# Patient Record
Sex: FEMALE | Race: OTHER | Employment: UNEMPLOYED | URBAN - METROPOLITAN AREA
[De-identification: names, ages, dates, MRNs, and addresses within clinical notes are randomized per-mention and may not be internally consistent; named-entity substitution may affect disease eponyms.]

---

## 2021-01-11 ENCOUNTER — HOSPITAL ENCOUNTER (EMERGENCY)
Age: 36
Discharge: STILL ADMITTED OR ADMINISTRATIVE | End: 2021-01-11
Attending: EMERGENCY MEDICINE

## 2021-01-11 ENCOUNTER — APPOINTMENT (OUTPATIENT)
Dept: ULTRASOUND IMAGING | Age: 36
End: 2021-01-11
Attending: EMERGENCY MEDICINE

## 2021-01-11 VITALS
OXYGEN SATURATION: 100 % | DIASTOLIC BLOOD PRESSURE: 78 MMHG | HEART RATE: 89 BPM | TEMPERATURE: 98 F | RESPIRATION RATE: 17 BRPM | SYSTOLIC BLOOD PRESSURE: 114 MMHG

## 2021-01-11 DIAGNOSIS — O20.9 VAGINAL BLEEDING IN PREGNANCY, FIRST TRIMESTER: Primary | ICD-10-CM

## 2021-01-11 PROBLEM — Z3A.10 10 WEEKS GESTATION OF PREGNANCY: Status: ACTIVE | Noted: 2021-01-11

## 2021-01-11 PROBLEM — O20.0 THREATENED ABORTION, ANTEPARTUM: Status: ACTIVE | Noted: 2021-01-11

## 2021-01-11 LAB
ABO + RH BLD: NORMAL
ALBUMIN SERPL-MCNC: 3.8 G/DL (ref 3.5–5)
ALBUMIN/GLOB SERPL: 0.8 {RATIO} (ref 1.1–2.2)
ALP SERPL-CCNC: 67 U/L (ref 45–117)
ALT SERPL-CCNC: 37 U/L (ref 12–78)
ANION GAP SERPL CALC-SCNC: 3 MMOL/L (ref 5–15)
AST SERPL-CCNC: 22 U/L (ref 15–37)
BASOPHILS # BLD: 0 K/UL (ref 0–0.1)
BASOPHILS NFR BLD: 1 % (ref 0–1)
BILIRUB SERPL-MCNC: 0.4 MG/DL (ref 0.2–1)
BLOOD BANK CMNT PATIENT-IMP: NORMAL
BUN SERPL-MCNC: 7 MG/DL (ref 6–20)
BUN/CREAT SERPL: 12 (ref 12–20)
CALCIUM SERPL-MCNC: 9.5 MG/DL (ref 8.5–10.1)
CHLORIDE SERPL-SCNC: 105 MMOL/L (ref 97–108)
CO2 SERPL-SCNC: 29 MMOL/L (ref 21–32)
COMMENT, HOLDF: NORMAL
CREAT SERPL-MCNC: 0.58 MG/DL (ref 0.55–1.02)
DIFFERENTIAL METHOD BLD: ABNORMAL
EOSINOPHIL # BLD: 0.1 K/UL (ref 0–0.4)
EOSINOPHIL NFR BLD: 1 % (ref 0–7)
ERYTHROCYTE [DISTWIDTH] IN BLOOD BY AUTOMATED COUNT: 12.7 % (ref 11.5–14.5)
GLOBULIN SER CALC-MCNC: 4.7 G/DL (ref 2–4)
GLUCOSE SERPL-MCNC: 82 MG/DL (ref 65–100)
HCG SERPL-ACNC: ABNORMAL MIU/ML (ref 0–6)
HCT VFR BLD AUTO: 43.7 % (ref 35–47)
HCT, EXTERNAL: 47.3
HGB BLD-MCNC: 14.6 G/DL (ref 11.5–16)
HGB, EXTERNAL: 14.6
IMM GRANULOCYTES # BLD AUTO: 0.1 K/UL (ref 0–0.04)
IMM GRANULOCYTES NFR BLD AUTO: 1 % (ref 0–0.5)
LYMPHOCYTES # BLD: 2 K/UL (ref 0.8–3.5)
LYMPHOCYTES NFR BLD: 25 % (ref 12–49)
MCH RBC QN AUTO: 29 PG (ref 26–34)
MCHC RBC AUTO-ENTMCNC: 33.4 G/DL (ref 30–36.5)
MCV RBC AUTO: 86.9 FL (ref 80–99)
MONOCYTES # BLD: 0.5 K/UL (ref 0–1)
MONOCYTES NFR BLD: 6 % (ref 5–13)
NEUTS SEG # BLD: 5.4 K/UL (ref 1.8–8)
NEUTS SEG NFR BLD: 66 % (ref 32–75)
NRBC # BLD: 0 K/UL (ref 0–0.01)
NRBC BLD-RTO: 0 PER 100 WBC
PLATELET # BLD AUTO: 217 K/UL (ref 150–400)
PLATELET CNT,   EXTERNAL: 217
PMV BLD AUTO: 11.7 FL (ref 8.9–12.9)
POTASSIUM SERPL-SCNC: 3.5 MMOL/L (ref 3.5–5.1)
PROT SERPL-MCNC: 8.5 G/DL (ref 6.4–8.2)
RBC # BLD AUTO: 5.03 M/UL (ref 3.8–5.2)
SAMPLES BEING HELD,HOLD: NORMAL
SODIUM SERPL-SCNC: 137 MMOL/L (ref 136–145)
TYPE, ABO & RH, EXTERNAL: NORMAL
WBC # BLD AUTO: 8 K/UL (ref 3.6–11)

## 2021-01-11 PROCEDURE — 36415 COLL VENOUS BLD VENIPUNCTURE: CPT

## 2021-01-11 PROCEDURE — 85025 COMPLETE CBC W/AUTO DIFF WBC: CPT

## 2021-01-11 PROCEDURE — 76830 TRANSVAGINAL US NON-OB: CPT

## 2021-01-11 PROCEDURE — 84702 CHORIONIC GONADOTROPIN TEST: CPT

## 2021-01-11 PROCEDURE — 99282 EMERGENCY DEPT VISIT SF MDM: CPT

## 2021-01-11 PROCEDURE — 86900 BLOOD TYPING SEROLOGIC ABO: CPT

## 2021-01-11 PROCEDURE — 80053 COMPREHEN METABOLIC PANEL: CPT

## 2021-01-11 PROCEDURE — 76856 US EXAM PELVIC COMPLETE: CPT

## 2021-01-11 NOTE — DISCHARGE INSTRUCTIONS
Your ultrasound showed the baby had a good heart beat and is approximately 10 weeks 5 days. Please follow up with the [facility] doctors you met in the ER today. Thank you.

## 2021-01-11 NOTE — CONSULTS
Gynecology Consult    Name: Phil Homans MRN: [id number] SSN: [id number]    YOB: 1985  Age: 38 y.o. Sex: female       Subjective:      Chief complaint:  spotting    Naomy Hayes is a 38 y.o.  female with a history of previous miscarriage x2 who presents for 4 days of vaginal spotting. Pt states she is around 10 weeks pregnant, she first started having some spotting of blood 4 days ago. She presented to an OSH where an [address] showed a viable intrauterine pregnancy and on return to the same OSH 2 days later was told she had a \"dropping HCG. \" Since that time she has had continued spotting, prompting her to present to the ED. Pt's first miscarriage was at the age of 24 around 11 weeks, her second was when she was 33 at 12 weeks. Pt also notes that she has a hx of abnormal pap smear and was treated w/ a \"laser\" approximately 10yrs ago. She is not currently having any vaginal bleeding. She denies dizziness, lightheadedness, N/V, abd pain, dyspnea, fatigue, fever, chills. [facility] and does not have an OB that she sees regularly. The current method of family planning is none. History reviewed. No pertinent past medical history. History reviewed. No pertinent surgical history. OB History        1    Para        Term                AB        Living           SAB        TAB        Ectopic        Molar        Multiple        Live Births                  No Known Allergies      History reviewed. No pertinent family history.   Social History     Socioeconomic History    Marital status: SINGLE     Spouse name: Not on file    Number of children: Not on file    Years of education: Not on file    Highest education level: Not on file   Occupational History    Not on file   Social Needs    Financial resource strain: Not on file    Food insecurity     Worry: Not on file     Inability: Not on file   [employer] needs Medical: Not on file     Non-medical: Not on file   Tobacco Use    Smoking status: Never Smoker    Smokeless tobacco: Never Used   Substance and Sexual Activity    Alcohol use: Not Currently    Drug use: Never    Sexual activity: Not on file   Lifestyle    Physical activity     Days per week: Not on file     Minutes per session: Not on file    Stress: Not on file   Relationships    Social connections     Talks on phone: Not on file     Gets together: Not on file     Attends [employer] service: Not on file     Active member of club or organization: Not on file     Attends meetings of clubs or organizations: Not on file     Relationship status: Not on file    Intimate partner violence     Fear of current or ex partner: Not on file     Emotionally abused: Not on file     Physically abused: Not on file     Forced sexual activity: Not on file   Other Topics Concern    Not on file   Social History Narrative    Not on file       Review of Systems:  A comprehensive review of systems was negative except for that written in the History of Present Illness. Objective:     Vitals:    01/11/21 1153   BP: 114/78   Pulse: 89   Resp: 17   Temp: 98 °F (36.7 °C)   SpO2: 100%       General:  alert, cooperative, no distress, appears stated age   Skin:  Normal.   Eyes: conjunctivae/corneas clear. PERRL, EOM's intact. Fundi benign   Lungs:  clear to auscultation bilaterally   Heart:  regular rate and rhythm, S1, S2 normal, no murmur, click, rub or gallop   Abdomen: soft, non-tender. Bowel sounds normal. No masses,  no organomegaly   Extremities:  extremities normal, atraumatic, no cyanosis or edema   Neurologic:  AOx3.  CN II-XII grossly intact   Psychiatric:  anxious     Recent Results (from the past 24 hour(s))   CBC WITH AUTOMATED DIFF    Collection Time: 01/11/21 12:47 PM   Result Value Ref Range    WBC 8.0 3.6 - 11.0 K/uL    RBC 5.03 3.80 - 5.20 M/uL    HGB 14.6 11.5 - 16.0 g/dL    HCT 43.7 35.0 - 47.0 %    MCV 86.9 80.0 - 99.0 FL    MCH 29.0 26.0 - 34.0 PG    MCHC 33.4 30.0 - 36.5 g/dL    RDW 12.7 11.5 - 14.5 %    PLATELET 985 570 - 376 K/uL    MPV 11.7 8.9 - 12.9 FL    NRBC 0.0 0  WBC    ABSOLUTE NRBC 0.00 0.00 - 0.01 K/uL    NEUTROPHILS 66 32 - 75 %    LYMPHOCYTES 25 12 - 49 %    MONOCYTES 6 5 - 13 %    EOSINOPHILS 1 0 - 7 %    BASOPHILS 1 0 - 1 %    IMMATURE GRANULOCYTES 1 (H) 0.0 - 0.5 %    ABS. NEUTROPHILS 5.4 1.8 - 8.0 K/UL    ABS. LYMPHOCYTES 2.0 0.8 - 3.5 K/UL    ABS. MONOCYTES 0.5 0.0 - 1.0 K/UL    ABS. EOSINOPHILS 0.1 0.0 - 0.4 K/UL    ABS. BASOPHILS 0.0 0.0 - 0.1 K/UL    ABS. IMM. GRANS. 0.1 (H) 0.00 - 0.04 K/UL    DF AUTOMATED     METABOLIC PANEL, COMPREHENSIVE    Collection Time: 01/11/21 12:47 PM   Result Value Ref Range    Sodium 137 136 - 145 mmol/L    Potassium 3.5 3.5 - 5.1 mmol/L    Chloride 105 97 - 108 mmol/L    CO2 29 21 - 32 mmol/L    Anion gap 3 (L) 5 - 15 mmol/L    Glucose 82 65 - 100 mg/dL    BUN 7 6 - 20 MG/DL    Creatinine 0.58 0.55 - 1.02 MG/DL    BUN/Creatinine ratio 12 12 - 20      GFR est AA >60 >60 ml/min/1.73m2    GFR est non-AA >60 >60 ml/min/1.73m2    Calcium 9.5 8.5 - 10.1 MG/DL    Bilirubin, total 0.4 0.2 - 1.0 MG/DL    ALT (SGPT) 37 12 - 78 U/L    AST (SGOT) 22 15 - 37 U/L    Alk. phosphatase 67 45 - 117 U/L    Protein, total 8.5 (H) 6.4 - 8.2 g/dL    Albumin 3.8 3.5 - 5.0 g/dL    Globulin 4.7 (H) 2.0 - 4.0 g/dL    A-G Ratio 0.8 (L) 1.1 - 2.2     BLOOD TYPE, (ABO+RH)    Collection Time: 01/11/21 12:47 PM   Result Value Ref Range    ABO/Rh(D) O POSITIVE     Comment SAMPLE NOT USABLE FOR CROSSMATCH    SAMPLES BEING HELD    Collection Time: 01/11/21 12:47 PM   Result Value Ref Range    SAMPLES BEING HELD 1RED,1BL,1SST     COMMENT        Add-on orders for these samples will be processed based on acceptable specimen integrity and analyte stability, which may vary by analyte.    BETA HCG, QT    Collection Time: 01/11/21 12:48 PM   Result Value Ref Range    Beta HCG, QT 61,230 (H) 0 - 6 MIU/ML         Imaging:   Transvaginal US:  TRANSVAGINAL ULTRASOUND:  Realtime sonographic imaging of the pelvis was performed transvaginally. The  uterus  is normal in appearance. Ovaries are within normal limits. There is a left adnexal cyst measuring 5.5 x  4.2 x 5.9 cm. Nonspecific. An intrauterine gestation is identified. Gestational  sac, fetal pole and yolk sac are within normal limits. Fetal heart rate of 170. The cervix is within normal limits. The placenta is not visualized due to early  stage of gestation. Estimated gestational age of 9 weeks 6 days. Crown-rump  length of 3.08 cm consistent with a 10 week gestation. Gestational sac diameter  4.9 cm consistent with a 10 week 4 day gestation.     IMPRESSION:   Single intrauterine gestation. Intrauterine gestation is within normal limits.     Left adnexal cystic focus measures 5.5 x 4.2 x 5.9 cm. Nonspecific. Follow-up  ultrasound in 3-5 days to evaluate for diminished size/resolution recommended    Assessment:     36yo  at 9w 4d by transvaginal US who presents w/ vaginal spotting in the context of SIUP. Plan:     Vaginal spotting in the setting of SIUP: Pt w/ several days of spotting but denies heavy bleeding, abd pain, expulsion of POC. Transvaginal US shows viable IUP. HCG declining in comparison to labs from OSH however this is typical at ~10 weeks gestation. Pt asymptomatic and hemodynamically stable. POA Hgb 14.6. Spotting possibly related to normal pregnancy, less likely to threatened .   -Pt to f/u at [facility] in a few days for repeat US to assess continued viability of pregnancy and possible establishment of prenatal care. Patient seen, examined, and discussed with Dr. Lozano. I saw the patient and examined her with the resident. I agree with the diagnosis and plan.   Threatened , antepartum  Discharge home  Return for vaginal bleeding, and cramping  Follow up in 1 weeks     Signed By:  Leah Euceda MD January 11, 2021

## 2021-01-11 NOTE — PROGRESS NOTES
Pt notes she is 20 weeks pregnant. She noteson 1/7 she started with some vaginal spotting and some lower abdominal cramping, left and right-sided. She states she went to [facility] for evaluation who performed ultrasound showing that the baby was okay. She was instructed to return to them 2 days later, they noted that at that time her beta hCG had dropped. She is awaiting for an appointment with [facility] and is currently still waiting on the paperwork. Reported her vaginal bleeding stopped by this time however started again yesterday with some spotting. She notes worsening pain. She also notes nausea yesterday morning and this morning, she denies any currently. She does not have an OB/GYN established. 12:29 PM  I have evaluated the patient as the Provider in Triage. I have reviewed Her vital signs and the triage nurse assessment. I have talked with the patient and any available family and advised that I am the provider in triage and have ordered the appropriate study to initiate their work up based on the clinical presentation during my assessment. I have advised that the patient will be accommodated in the Main ED as soon as possible. I have also requested to contact the triage nurse or myself immediately if the patient experiences any changes in their condition during this brief waiting period.   Payton Mejia PA-C

## 2021-01-11 NOTE — ED NOTES
Discussed the importance of follow up care and seeking immediate medical attention for any changes or worsening in the patients condition. Patient verbalized understanding of discharge paperwork, medication use and follow up care.  Symptoms

## 2021-01-12 NOTE — ED PROVIDER NOTES
Patient is a 48-year-old G3, P0, with history of 2 miscarriages presents at approximately 9 weeks gestational age. Patient reports that she was seen at an outside hospital last week after she began having vaginal bleeding. Was told that her pregnancy hormone was not increasing appropriately and that she should follow-up with OB as an outpatient. Patient reports that she is waiting for an appointment at this time. Notes that her vaginal bleeding worsened overnight prompting ED visit today. Has her paperwork from outside hospital.           No past medical history on file. No past surgical history on file. No family history on file.     Social History     Socioeconomic History    Marital status: SINGLE     Spouse name: Not on file    Number of children: Not on file    Years of education: Not on file    Highest education level: Not on file   Occupational History    Not on file   Social Needs    Financial resource strain: Not on file    Food insecurity     Worry: Not on file     Inability: Not on file    Transportation needs     Medical: Not on file     Non-medical: Not on file   Tobacco Use    Smoking status: Not on file   Substance and Sexual Activity    Alcohol use: Not on file    Drug use: Not on file    Sexual activity: Not on file   Lifestyle    Physical activity     Days per week: Not on file     Minutes per session: Not on file    Stress: Not on file   Relationships    Social connections     Talks on phone: Not on file     Gets together: Not on file     Attends Muslim service: Not on file     Active member of club or organization: Not on file     Attends meetings of clubs or organizations: Not on file     Relationship status: Not on file    Intimate partner violence     Fear of current or ex partner: Not on file     Emotionally abused: Not on file     Physically abused: Not on file     Forced sexual activity: Not on file   Other Topics Concern    Not on file   Social History Narrative    Not on file         ALLERGIES: Patient has no known allergies. Review of Systems   Constitutional: Negative for chills and fever. HENT: Negative for drooling and nosebleeds. Eyes: Negative for pain and itching. Respiratory: Negative for choking and stridor. Cardiovascular: Negative for leg swelling. Gastrointestinal: Negative for abdominal distention, abdominal pain and rectal pain. Endocrine: Negative for heat intolerance and polyphagia. Genitourinary: Positive for vaginal bleeding. Negative for difficulty urinating, dyspareunia, dysuria, enuresis, flank pain, frequency and genital sores. Musculoskeletal: Negative for arthralgias and joint swelling. Skin: Negative for color change. Allergic/Immunologic: Negative for immunocompromised state. Neurological: Negative for tremors and speech difficulty. Hematological: Negative for adenopathy. Psychiatric/Behavioral: Negative for dysphoric mood and sleep disturbance. Vitals:    01/11/21 1153   BP: 114/78   Pulse: 89   Resp: 17   Temp: 98 °F (36.7 °C)   SpO2: 100%            Physical Exam  Vitals signs and nursing note reviewed. Constitutional:       General: She is not in acute distress. Appearance: She is well-developed. She is not ill-appearing, toxic-appearing or diaphoretic. HENT:      Head: Normocephalic. Nose: Nose normal.   Eyes:      Conjunctiva/sclera: Conjunctivae normal.   Neck:      Musculoskeletal: Normal range of motion and neck supple. Cardiovascular:      Rate and Rhythm: Regular rhythm. Heart sounds: Normal heart sounds. Pulmonary:      Effort: Pulmonary effort is normal. No respiratory distress. Breath sounds: Normal breath sounds. Abdominal:      General: There is no distension. Palpations: Abdomen is soft. Tenderness: There is no abdominal tenderness. Musculoskeletal: Normal range of motion. General: No deformity.    Skin:     General: Skin is warm and dry.   Neurological:      Mental Status: She is alert. Coordination: Coordination normal.   Psychiatric:         Behavior: Behavior normal.          [facility]  ED Course as of Jan 11 1932   Mon Jan 11, 2021   1248 Per records from [facility] that pt has, beta [place] [id number]. US 1/7/20: viable single IUP CRL 26.3 mm, 9 weeks, 3 days. ?hemorrhage. [AL]   1249 Hb per OSH discharge papers 12.9.    [AL]   [address] [AL]   56 Spoke with FP OB who will come down to see patient and establish care with her. And will follow up with her.     [AL]      ED Course User Index  [AL] Lucero Torres MD       Procedures    Patient's results have been reviewed with them. Patient and/or family have verbally conveyed their understanding and agreement of the patient's signs, symptoms, diagnosis, treatment and prognosis and additionally agree to follow up as recommended or return to the Emergency Room should their condition change prior to follow-up. Discharge instructions have also been provided to the patient with some educational information regarding their diagnosis as well a list of reasons why they would want to return to the ER prior to their follow-up appointment should their condition change.

## 2021-01-14 ENCOUNTER — TELEPHONE (OUTPATIENT)
Dept: FAMILY MEDICINE CLINIC | Age: 36
End: 2021-01-14

## 2021-01-15 ENCOUNTER — TELEPHONE (OUTPATIENT)
Dept: FAMILY MEDICINE CLINIC | Age: 36
End: 2021-01-15

## 2021-01-15 NOTE — TELEPHONE ENCOUNTER
----- Message from Tilman Severance sent at 1/14/2021  3:56 PM EST -----  Regarding: /Telephone  Contact: [phone]  General Message/Vendor Calls    Caller's first and last name: N/A      Reason for call: ultrasound appt / COVID protocol      Callback required yes/no and why: Yes, to verify. Best contact number(s): [phone]      Details to clarify the request: Pt would like to know if her boyfriend can attend the appt with her for her ultrasound so he can see the baby too?        Tilman Severance

## 2021-01-20 ENCOUNTER — HOSPITAL ENCOUNTER (OUTPATIENT)
Dept: LAB | Age: 36
Discharge: HOME | End: 2021-01-20

## 2021-01-20 ENCOUNTER — INITIAL PRENATAL (OUTPATIENT)
Dept: FAMILY MEDICINE CLINIC | Age: 36
End: 2021-01-20

## 2021-01-20 VITALS
HEART RATE: 84 BPM | HEIGHT: 62 IN | BODY MASS INDEX: 27.88 KG/M2 | OXYGEN SATURATION: 97 % | RESPIRATION RATE: 20 BRPM | SYSTOLIC BLOOD PRESSURE: 118 MMHG | WEIGHT: 151.5 LBS | DIASTOLIC BLOOD PRESSURE: 71 MMHG | TEMPERATURE: 97.6 F

## 2021-01-20 DIAGNOSIS — Z3A.10 10 WEEKS GESTATION OF PREGNANCY: Primary | ICD-10-CM

## 2021-01-20 DIAGNOSIS — B97.7 HPV (HUMAN PAPILLOMA VIRUS) INFECTION: ICD-10-CM

## 2021-01-20 DIAGNOSIS — Z98.890 HISTORY OF LOOP ELECTRICAL EXCISION PROCEDURE (LEEP): ICD-10-CM

## 2021-01-20 DIAGNOSIS — O20.0 THREATENED ABORTION, ANTEPARTUM: ICD-10-CM

## 2021-01-20 DIAGNOSIS — O09.519 ADVANCED MATERNAL AGE, PRIMIGRAVIDA, ANTEPARTUM: ICD-10-CM

## 2021-01-20 LAB
CHLAMYDIA, EXTERNAL: NEGATIVE
N. GONORRHEA, EXTERNAL: NEGATIVE

## 2021-01-20 PROCEDURE — 90686 IIV4 VACC NO PRSV 0.5 ML IM: CPT | Performed by: STUDENT IN AN ORGANIZED HEALTH CARE EDUCATION/TRAINING PROGRAM

## 2021-01-20 PROCEDURE — 88175 CYTOPATH C/V AUTO FLUID REDO: CPT

## 2021-01-20 PROCEDURE — 90471 IMMUNIZATION ADMIN: CPT | Performed by: STUDENT IN AN ORGANIZED HEALTH CARE EDUCATION/TRAINING PROGRAM

## 2021-01-20 PROCEDURE — 87624 HPV HI-RISK TYP POOLED RSLT: CPT

## 2021-01-20 PROCEDURE — 0500F INITIAL PRENATAL CARE VISIT: CPT | Performed by: STUDENT IN AN ORGANIZED HEALTH CARE EDUCATION/TRAINING PROGRAM

## 2021-01-20 NOTE — PROGRESS NOTES
Subjective:   Leela Christianson is a 38 y.o.  who is being seen today for her first obstetrical visit. Patient reports feeling well. No concerns at this time. Pregnancy complicated by H/o LEEP (), and thrreatened  1st Trimester. OB History:  See Chart    This is a planned pregnancy. LMP: 10/30/2020  GA: 11w5d by LMP  Estimated Date of Delivery: 2021     Taking prenatal vitamins? Yes   History of Sexual trauma? NO  History of STI's? HPV  History of Depression? NO    Relevant past medical history:(relevant to this pregnancy):   Denies HTN, DM or asthma. Denies thyroid problems      Pap smear history:  Last pap smear: Does not remember     Substance history:   She does not report current tobacco use. She does not report current alcohol use. She does not report current drug use. Exposure history: There are not indoor cat(s) in the home. Patient does not report issues with domestic violence. Allergies- reviewed:   No Known Allergies    Medications- reviewed:   No current outpatient medications on file. No current facility-administered medications for this visit. Past Medical History- reviewed:  Past Medical History:   Diagnosis Date    Abnormal Papanicolaou smear of cervix 2010    Normal Pap since    History of loop electrical excision procedure (LEEP) 2021    HPV (human papilloma virus) infection 2021       Past Surgical History- reviewed:   History reviewed. No pertinent surgical history.     Social History- reviewed:  Social History     Socioeconomic History    Marital status: SINGLE     Spouse name: Not on file    Number of children: Not on file    Years of education: Not on file    Highest education level: Not on file   Occupational History    Not on file   Social Needs    Financial resource strain: Not on file    Food insecurity     Worry: Not on file     Inability: Not on file   [employer] needs Medical: Not on file     Non-medical: Not on file   Tobacco Use    Smoking status: Never Smoker    Smokeless tobacco: Never Used   Substance and Sexual Activity    Alcohol use: Not Currently    Drug use: Never    Sexual activity: Not on file   Lifestyle    Physical activity     Days per week: Not on file     Minutes per session: Not on file    Stress: Not on file   Relationships    Social connections     Talks on phone: Not on file     Gets together: Not on file     Attends Jainism service: Not on file     Active member of club or organization: Not on file     Attends meetings of clubs or organizations: Not on file     Relationship status: Not on file    Intimate partner violence     Fear of current or ex partner: Not on file     Emotionally abused: Not on file     Physically abused: Not on file     Forced sexual activity: Not on file   Other Topics Concern    Not on file   Social History Narrative    Not on file       OB History- reviewed:  OB History    Para Term  AB Living   3       2     SAB TAB Ectopic Molar Multiple Live Births                    # Outcome Date GA Lbr Jeremiah/2nd Weight Sex Delivery Anes PTL Lv   3 Current            2 AB 18 8w0d    SAB      1 AB 12 8w0d    SAB           Objective:     Visit Vitals  /71 (BP 1 Location: Left arm, BP Patient Position: Sitting)   Pulse 84   Temp 97.6 °F (36.4 °C) (Temporal)   Resp 20   Ht 5' 2\" (1.575 m)   Wt 151 lb 8 oz (68.7 kg)   LMP 10/30/2020 (Exact Date)   SpO2 97%   BMI 27.71 kg/m²       See physical exam on flowsheet   [employer] exam chaperoned by nursing    Labs:  No results found for this or any previous visit (from the past 12 hour(s)).       Assessment and Plan:         ICD-10-CM ICD-9-CM    1. 10 weeks gestation of pregnancy  Z3A.10 V22.2 CULTURE, URINE      RPR      HIV 1/2 AG/AB, 4TH GENERATION,W RFLX CONFIRM      PAP IG, APTIMA HPV AND RFX 16/18,45 ([id number])      CHLAMYDIA/GC PCR      HEP B SURFACE AG HEMOGLOBIN FRACTIONATION      RUBELLA AB, IGG      VZV AB, IGG      INFLUENZA VIRUS VAC QUAD,SPLIT,PRESV FREE SYRINGE IM      CT IMMUNIZ ADMIN,1 SINGLE/COMB VAC/TOXOID      PAP IG, APTIMA HPV AND RFX 16/18,45 ([id number])      CULTURE, URINE      CHLAMYDIA/GC PCR   2. History of loop electrical excision procedure (LEEP)  Z98.890 V45.89    3. Threatened , antepartum  O20.0 640.03    4. HPV (human papilloma virus) infection  B97.7 079.4    5. Advanced maternal age, primigravida, antepartum  O12.26 18.46        38 y.o.  11w5d, Estimated Date of Delivery: 21 here for initial OB visit     Prenatal care  · IOB labs collected  · () CBC (Hgb 14.6) wnl, CMP wnl, O+  · Discussed recommended weight gain: 15-25lb. Starting Weight 151. · Cont Prenatal vitamins  · Influenza Vaccine: Today  · Discussed optional genetic screening: Info given, will discuss with family and given decision on next visit  · Request for [facility] anatomy scan faxed: Today  · Follow up in 4 weeks    H/o LEEP w/ h/o SAB: LEEP approximately in . Reports normal Paps since . - Will refer to [facility] for cervical length screening    Threatened : Seen in ED for vaginal spot bleeding on 2021. Vaginal bleeding has resolved. US at that time showed SIUP consistent w/ 10w4d gestation. AMA: BMI < 30. No Significant PMH. [id number].  - No indication for ASA      ---------------------------------------  · Continuity Provider: Dr. Rusty Rosario  · Pain mgmt.  in labor:   · Feeding:   · Circ:    ---------------------------------------    Orders Placed This Encounter    CT IMMUNIZ ADMIN,1 SINGLE/COMB VAC/TOXOID    CULTURE, URINE     Standing Status:   Future     Number of Occurrences:   1     Standing Expiration Date:   2021    CHLAMYDIA/GC PCR     Standing Status:   Future     Number of Occurrences:   1     Standing Expiration Date:   2021     Order Specific Question:   Sample source     Answer:   Urine [258]     Order Specific Question:   Specimen source     Answer:   Urine [258]    INFLUENZA VIRUS VAC QUAD,SPLIT,PRESV FREE SYRINGE IM (Flulaval, Fluzone, Fluarix) ([id number])    RPR     Standing Status:   Future     Standing Expiration Date:   1/20/2022    HIV 1/2 AG/AB, 4TH GENERATION,W RFLX CONFIRM     Standing Status:   Future     Standing Expiration Date:   1/20/2022    HEP B SURFACE AG     Standing Status:   Future     Standing Expiration Date:   1/20/2022    HEMOGLOBIN FRACTIONATION     Standing Status:   Future     Standing Expiration Date:   1/20/2022    RUBELLA AB, IGG     Standing Status:   Future     Standing Expiration Date:   7/20/2021    VZV AB, IGG     Standing Status:   Future     Standing Expiration Date:   1/20/2022    PAP IG, APTIMA HPV AND RFX 16/18,45 ([id number])     Standing Status:   Future     Number of Occurrences:   1     Standing Expiration Date:   1/20/2022     Order Specific Question:   Pap Source? Answer:   Endocervical     Order Specific Question:   Total Hysterectomy? Answer:   No     Order Specific Question:   Supracervical Hysterectomy? Answer:   No     Order Specific Question:   Post Menopausal?     Answer:   No     Order Specific Question:   Hormone Therapy? Answer:   No     Order Specific Question:   IUD? Answer:   No     Order Specific Question:   Abnormal Bleeding? Answer:   No     Order Specific Question:   Pregnant     Answer:   Yes     Order Specific Question:   Post Partum? Answer:   No     Order Specific Question:   Previous Treatment? Answer:   Peace Early         I have discussed the diagnosis with the patient and the intended plan as seen in the above orders. The patient has received an after-visit summary and questions were answered concerning future plans. I have discussed medication side effects and warnings with the patient as well.  Informed pt to return to the office or go to the ER if she experiences vaginal bleeding, vaginal discharge, leaking of fluid, pelvic cramping.       Pt seen and discussed with Isa (attending physician)    Kristyn Springer MD  Family Medicine Resident

## 2021-01-20 NOTE — PATIENT INSTRUCTIONS
Weeks 10 to 14 of Your Pregnancy: Care Instructions Your Care Instructions By weeks 10 to 15 of your pregnancy, the placenta has formed inside your uterus. It is possible to hear your baby's heartbeat with a special ultrasound device. Your baby's eyes can and do move. The arms and legs can bend. This is a good time to think about testing for birth defects. There are two types of tests: screening and diagnostic. Screening tests show the chance that a baby has a certain birth defect. They can't tell you for sure that your baby has a problem. Diagnostic tests show if a baby has a certain birth defect. It's your choice whether to have these tests. You and your partner can talk to your doctor or midwife about birth defects tests. Follow-up care is a key part of your treatment and safety. Be sure to make and go to all appointments, and call your doctor if you are having problems. It's also a good idea to know your test results and keep a list of the medicines you take. How can you care for yourself at home? Decide about tests · You can have screening tests and diagnostic tests to check for birth defects. The decision to have a test for birth defects is personal. Think about your age, your chance of passing on a family disease, your need to know about any problems, and what you might do after you have the test results. ? Triple or quadruple (quad) blood tests. These screening tests can be done between 15 and 20 weeks of pregnancy. They check the amounts of three or four substances in your blood. The doctor looks at these test results, along with your age and other factors, to find out the chance that your baby may have certain problems. ? Amniocentesis. This diagnostic test is used to look for chromosomal problems in the baby's cells. It can be done between 15 and 20 weeks of pregnancy, usually around week 16. ? Nuchal translucency test. This test uses ultrasound to measure the thickness of the area at the back of the baby's neck. An increase in the thickness can be an early sign of Down syndrome. ? Chorionic villus sampling (CVS). This is a test that looks for certain genetic problems with your baby. The same genes that are in your baby are in the placenta. A small piece of the placenta is taken out and tested. This test is done when you are 10 to 13 weeks pregnant. Ease discomfort · Slow down and take naps when you feel tired. · If your emotions swing, talk to someone. Crying, anxiety, and concentration problems are common. · If your gums bleed, try a softer toothbrush. If your gums are puffy and bleed a lot, see your dentist. 
· If you feel dizzy: ? Get up slowly after sitting or lying down. ? Drink plenty of fluids. ? Eat small snacks to keep your blood sugar stable. ? Put your head between your legs as though you were tying your shoelaces. ? Lie down with your legs higher than your head. Use pillows to prop up your feet. · If you have a headache: ? Lie down. ? Ask your partner or a good friend for a neck massage. ? Try cool cloths over your forehead or across the back of your neck. ? Use acetaminophen (Tylenol) for pain relief. Do not use nonsteroidal anti-inflammatory drugs (NSAIDs), such as ibuprofen (Advil, Motrin) or naproxen (Aleve), unless your doctor says it is okay. · If you have a nosebleed, pinch your nose gently, and hold it for a short while. To prevent nosebleeds, try massaging a small dab of petroleum jelly, such as Vaseline, in your nostrils. · If your nose is stuffed up, try saline (saltwater) nose sprays. Do not use decongestant sprays. Care for your breasts · Wear a bra that gives you good support. · Know that changes in your breasts are normal. 
? Your breasts may get larger and more tender. Tenderness usually gets better by 12 weeks. ? Your nipples may get darker and larger, and small bumps around your nipples may show more. ? The veins in your chest and breasts may show more. · Don't worry about \"toughening'\" your nipples. Breastfeeding will naturally do this. Where can you learn more? Go to http://www.gray.com/ Enter L486 in the search box to learn more about \"Weeks 10 to 14 of Your Pregnancy: Care Instructions. \" Current as of: February 11, 2020               Content Version: 12.6 © 4121-5925 [employer]. Care instructions adapted under license by [employer] (which disclaims liability or warranty for this information). If you have questions about a medical condition or this instruction, always ask your healthcare professional. [address] any warranty or liability for your use of this information.

## 2021-01-20 NOTE — PROGRESS NOTES
I reviewed with the resident the medical history and the resident's findings on the physical examination. I discussed with the resident the patient's diagnosis and concur with the plan. 34yo  @ 11w5d by LMP c/w 10wk scan   1. IUP: Rh pos, undecided about genetic screening but handout given   2. Hx LEEP: , normal paps since that time, desires CLS with MFM given risk of cervical insufficiency   3.   AMA    Referred to [facility]

## 2021-01-21 LAB
BACTERIA SPEC CULT: NORMAL
SERVICE CMNT-IMP: NORMAL

## 2021-01-22 LAB
C TRACH DNA SPEC QL NAA+PROBE: NEGATIVE
N GONORRHOEA DNA SPEC QL NAA+PROBE: NEGATIVE
SAMPLE TYPE: NORMAL
SERVICE CMNT-IMP: NORMAL
SPECIMEN SOURCE: NORMAL

## 2021-01-28 ENCOUNTER — HOSPITAL ENCOUNTER (OUTPATIENT)
Dept: PERINATAL CARE | Age: 36
Discharge: HOME | End: 2021-01-28
Attending: OBSTETRICS & GYNECOLOGY

## 2021-01-28 PROCEDURE — 76801 OB US < 14 WKS SINGLE FETUS: CPT | Performed by: OBSTETRICS & GYNECOLOGY

## 2021-02-04 NOTE — PROGRESS NOTES
Estimated Date of Delivery: 21    Hx of LEEP  Cervix shortened but too early to measure  F/u scan in 4 weeks  Placenta previa  Normal NT  5cm left ovarian cyst

## 2021-02-17 ENCOUNTER — ROUTINE PRENATAL (OUTPATIENT)
Dept: FAMILY MEDICINE CLINIC | Age: 36
End: 2021-02-17

## 2021-02-17 DIAGNOSIS — Z98.890 HISTORY OF LOOP ELECTRICAL EXCISION PROCEDURE (LEEP): ICD-10-CM

## 2021-02-17 DIAGNOSIS — N83.202 LEFT OVARIAN CYST: ICD-10-CM

## 2021-02-17 DIAGNOSIS — Z3A.15 15 WEEKS GESTATION OF PREGNANCY: Primary | ICD-10-CM

## 2021-02-17 DIAGNOSIS — O09.522 MULTIGRAVIDA OF ADVANCED MATERNAL AGE IN SECOND TRIMESTER: ICD-10-CM

## 2021-02-17 DIAGNOSIS — O44.02 PLACENTA PREVIA IN SECOND TRIMESTER: ICD-10-CM

## 2021-02-17 PROCEDURE — 0502F SUBSEQUENT PRENATAL CARE: CPT | Performed by: STUDENT IN AN ORGANIZED HEALTH CARE EDUCATION/TRAINING PROGRAM

## 2021-02-17 RX ORDER — SWAB
1 SWAB, NON-MEDICATED MISCELLANEOUS DAILY
COMMUNITY

## 2021-02-17 NOTE — PROGRESS NOTES
Fara Lal  38 y.o. female  1985  [address]  [id number]    [phone] (home)      [address]:    [facility]  [facility]       Encounter Date: 2021 at 10:00 AM    Consent:  She and/or the health care decision maker is aware that this encounter will be billed as a routine OB appointment and that she may receive a bill for this telephone service, depending on her insurance coverage, and has provided verbal consent to proceed: Yes    Follow-up Prenatal     History of Present Illness   Due to language barrier, an  was used with this patient - [facility]  Miriam Veronica. Contractions: no  LOF: no  Vaginal bleeding: no  Fetal movement (if >20 wk): no, too early    COVID-19 Screenin) Patient denies complaints of shortness of breath or difficulty breathing, sore throat,  chills, fatigue, muscle aches, loss of taste or smell, or GI symptoms(nausea, vomiting or diarrhea): No  2) Patient denies complaints of cough or fever over 100F: No  3) Patient denies leaving the country in the past 14 days or any other recent travel: No  4) Patient denies being exposed to anyone with COVID-19 and has not been around any one that has been sick with COVID-19 like symptoms as listed above: No  5) Patient informed to wear mask when arriving for appointment: Yes    Vitals/Objective:   General: Patient speaking in complete sentences without effort. Normal speech and cooperative. Due to this being a Virtual Check-in/Telephone evaluation, many elements of the physical examination are unable to be assessed. Assessment and Plan:   Fara Lal is a 38 y.o.  @ 15w5d by LMP = 10 wk US evaluated by telephone. 1. IUP: O pos, IOB labs never collected?, GC/CT neg, pap NILM, HPV neg. Declined genetic testing.  S/p flu.   2. Hx LEEP:  at [facility], normal paps since that time, desires cervical length screening with MFM given risk of cervical insufficiency - early scan appears short but too early to measure; follow up scan scheduled for 2/25  3. AMA  4. Placenta previa: had VB in 1st tri. Seen on early MFM scan. Repeat scan 2/25  5. L ovarian cyst: 5cm, follow up pp    Continuity Residents: None    -Patient informed of her next appointment: 3/17/2021 at 8:45AM in clinic  -Advised to come in sooner for any concerns  -Pt informed that after 28 wk she will be asked to do weekly home BP monitoring and it was recommended she buy or borrow a cuff ahead of time. Alternative is going to a grocery store/pharmacy to check. One BP should be checked weekly and written in a log >28 weeks.  -Patient educated on kick counts (after 28 weeks): baby should move 10 times in 2 hours (can be a kick, roll, flutter, swish). -Patient was reminded about social distancing and to avoid going into public when possible. Patient understands that this encounter was a temporary measure, and the importance of further follow up and examination was emphasized. Patient verbalized understanding. I affirm this is a Patient Initiated Episode with an Established Patient who has not had a related appointment within my department in the past 7 days or scheduled within the next 24 hours. Note: not billable if this call serves to triage the patient into an appointment for the relevant concern      Electronically Signed: Axel Oliva DO  Providers location when delivering service: home      ICD-10-CM ICD-9-CM    1. 15 weeks gestation of pregnancy  Z3A.15 V22.2    2. History of loop electrical excision procedure (LEEP)  Z98.890 V45.89    3. Multigravida of advanced maternal age in second trimester  O09.522 65.56    4. Placenta previa in second trimester  O44.02 641.13    5.  Left ovarian cyst  N83.202 620.2        Pursuant to the emergency declaration under the [facility]5 waiver authority and the Coronavirus Preparedness and Response Supplemental Appropriations Act, this Virtual  Visit was conducted, with patient's consent, to reduce the patient's risk of exposure to COVID-19 and provide continuity of care for an established patient. History   Patients past medical, surgical and family histories were personally reviewed and updated.   yes    Patient Active Problem List   Diagnosis Code    Threatened , antepartum O20.0    10 weeks gestation of pregnancy Z3A.10    History of loop electrical excision procedure (LEEP) Z98.890    HPV (human papilloma virus) infection B97.7    Multigravida of advanced maternal age in second trimester O09.522    Placenta previa in second trimester O44.02    Left ovarian cyst N83.202              Current Medications/Allergies   Medications and Allergies reviewed:      No Known Allergies

## 2021-02-17 NOTE — PROGRESS NOTES
I reviewed with the resident the medical history and the resident's findings on the physical examination. I discussed with the resident the patient's diagnosis and concur with the plan. 36yo  @ 15w5d by LMP c/w 10wk scan   1. IUP: Rh pos, undecided about genetic screening but handout given, her IOB labs not collected at last visit so needs at next    2. Hx LEEP: , normal paps since that time, desires CLS with M given risk of cervical insufficiency (got scheduled by MFM office a bit too early so had one scan already with no concerns but too early to measure)   3.   AMA    Referred to [facility]

## 2021-02-25 ENCOUNTER — HOSPITAL ENCOUNTER (OUTPATIENT)
Dept: PERINATAL CARE | Age: 36
Discharge: HOME | End: 2021-02-25
Attending: OBSTETRICS & GYNECOLOGY

## 2021-02-25 PROCEDURE — 76817 TRANSVAGINAL US OBSTETRIC: CPT | Performed by: OBSTETRICS & GYNECOLOGY

## 2021-02-25 PROCEDURE — 76815 OB US LIMITED FETUS(S): CPT | Performed by: OBSTETRICS & GYNECOLOGY

## 2021-02-25 NOTE — PROGRESS NOTES
Estimated Date of Delivery: 21    Hx of LEEP   Cervix 27mm no funnel  F/u cervical length at anatomy scan  [facility]

## 2021-03-16 NOTE — PROGRESS NOTES
Return OB Visit     Subjective:   Chantelle Rubio is a 38 y.o.  at 19w4d by LMP c/w 10 wk scan  Estimated Date of Delivery: 21    LOF: None  Vaginal bleeding: None  Fetal movement (after 20 weeks): Too early   Contractions: None  Dysuria: None  Headaches, blurred vision, RUQ pain: None  Taking prenatal vitamins: Yes    Concerns today: Rash on breast that started 2 weeks ago, it was initially distributed all over right breast, but currently on a small aspect of her skin, itches, not painful and cold water helps. Sharp pain on right hip, comes and go, laying down makes it better. Allergies   No Known Allergies  Medications:   Current Outpatient Medications   Medication Sig    prenatal vit-iron fumarate-fa (PRENATAL PLUS with IRON) 28 mg iron- 800 mcg tab Take 1 Tab by mouth daily. No current facility-administered medications for this visit. Past Medical History:  Past Medical History:   Diagnosis Date    Abnormal Papanicolaou smear of cervix 2010    Normal Pap since    History of loop electrical excision procedure (LEEP) 2021    HPV (human papilloma virus) infection 2021     Past Surgical History:   No past surgical history on file. Social History:  Social History     Tobacco Use    Smoking status: Never Smoker    Smokeless tobacco: Never Used   Substance Use Topics    Alcohol use: Not Currently    Drug use: Never     Immunizations:   Immunization History   Administered Date(s) Administered    Influenza Vaccine Savosolar) PF (>6 Mo Flulaval, Fluarix, and >3 Yrs Afluria, Fluzone 29777) 2021     Objective     Visit Vitals  /73   Pulse 78   Temp 97.3 °F (36.3 °C) (Temporal)   Resp 16   Ht 5' 2\" (1.575 m)   Wt 149 lb (67.6 kg)   LMP 10/30/2020 (Exact Date)   SpO2 98%   BMI 27.25 kg/m²       Physical Exam  GENERAL APPEARANCE: alert, well appearing, in no apparent distress  ABDOMEN: gravid, fundal height 20 cm.   PSYCH: normal mood and affect  Breast: about 1 cm erythematous spot in lateral aspect of right breast. No discharge, not painful.     Assessment   Veronica Sterling is a 35 y.o.  at 19w5d by LMP here for a return OB visit.   Estimated Date of Delivery: 21     Plan     IUP: O pos. GC/CT neg, pap NILM, HPV neg. Declined genetic testing. S/p flu.  - Labs were collected today at the clinic.     Hx LEEP:  at [facility], normal paps since that time. scan on (): cervix is 27 mm long, with no evidence of a funnel.   - Anatomy survey /cervix length in 4 weeks () at [facility]:   - NIPT collected.      Placenta previa: had VB in  tri. Seen on early MFM scan. Repeat scan () showed anterior placenta.     L ovarian cyst: 5cm in size  - Follow up pp    Hip Pain: likely 2/2 round ligament pain.   - pt given information about round ligament pain.   - use tylenol as needed    R breast rash: improving. likely 2/2 skin irritation   - Use otc calamine lotion as needed for itching.       I have discussed the diagnosis with the patient and the intended plan as seen in the above orders.  The patient has received an after-visit summary and questions were answered concerning future plans.  I have discussed medication side effects and warnings with the patient as well. Informed pt to return to the office or go to the ER if she experiences vaginal bleeding, vaginal discharge, leaking of fluid, pelvic cramping.    Patient discussed with Dr. Moss Attending Physician    Veronica Herrera MD  Family Medicine Resident

## 2021-03-17 ENCOUNTER — ROUTINE PRENATAL (OUTPATIENT)
Dept: FAMILY MEDICINE CLINIC | Age: 36
End: 2021-03-17

## 2021-03-17 VITALS
TEMPERATURE: 97.3 F | DIASTOLIC BLOOD PRESSURE: 73 MMHG | RESPIRATION RATE: 16 BRPM | SYSTOLIC BLOOD PRESSURE: 108 MMHG | BODY MASS INDEX: 27.42 KG/M2 | HEIGHT: 62 IN | OXYGEN SATURATION: 98 % | WEIGHT: 149 LBS | HEART RATE: 78 BPM

## 2021-03-17 DIAGNOSIS — Z3A.10 10 WEEKS GESTATION OF PREGNANCY: ICD-10-CM

## 2021-03-17 DIAGNOSIS — Z3A.19 19 WEEKS GESTATION OF PREGNANCY: Primary | ICD-10-CM

## 2021-03-17 LAB
HBSAG, EXTERNAL: NEGATIVE
HIV, EXTERNAL: NEGATIVE
RPR, EXTERNAL: NON REACTIVE
RUBELLA, EXTERNAL: NORMAL

## 2021-03-17 PROCEDURE — 0502F SUBSEQUENT PRENATAL CARE: CPT | Performed by: STUDENT IN AN ORGANIZED HEALTH CARE EDUCATION/TRAINING PROGRAM

## 2021-03-17 NOTE — PROGRESS NOTES
I reviewed with the resident the medical history and the resident's findings on the physical examination. I discussed with the resident the patient's diagnosis and concur with the plan. 34yo  @ 19w5d by LMP c/w 10wk scan   1. IUP: Rh pos, NIPT collected, her IOB labs not collected at last visit so collected today  2. Hx LEEP: , normal paps since that time, CLS have been normal thus far   3.   AMA    Referred to [facility]

## 2021-03-17 NOTE — PROGRESS NOTES
Chief Complaint   Patient presents with    Routine Prenatal Visit     19w 5 d pt denies bleeding, contractions or leakage of fluids.  Rash     on breast      Vitals:    03/17/21 0859   BP: 108/73   Pulse: 78   Resp: 16   Temp: 97.3 °F (36.3 °C)   TempSrc: Temporal   SpO2: 98%   Weight: 149 lb (67.6 kg)   Height: 5' 2\" (1.575 m)   1. Have you been to the ER, urgent care clinic since your last visit? Hospitalized since your last visit? No    2. Have you seen or consulted any other health care providers outside of the [address] [place] [address] since your last visit? Include any pap smears or colon screening.  No

## 2021-03-17 NOTE — PATIENT INSTRUCTIONS
Round Ligament Pain: Care Instructions Your Care Instructions Round ligament pain is a common pain during pregnancy. You may feel a sharp brief pain on one or both sides of your belly. It may go down into your groin. It's usually felt for the first time during the second trimester. This pain is a normal part of pregnancy. It will go away as your pregnancy continues or after your baby is born. Your uterus is supported by two ligaments that go from the top and sides of the uterus to the bones of the pelvis. These are the round ligaments. As your uterus grows, these ligaments stretch and tighten with your movements. This may be the cause of the pain. You may find that certain activities seem to cause pain. If you can, avoid those activities. Your doctor can usually diagnose round ligament pain from your symptoms and an exam. If you have bleeding or other symptoms, your doctor may also do an imaging test, such as an ultrasound. Your doctor may suggest that you take an over-the-counter pain medicine, such as acetaminophen. Follow-up care is a key part of your treatment and safety. Be sure to make and go to all appointments, and call your doctor if you are having problems. It's also a good idea to know your test results and keep a list of the medicines you take. How can you care for yourself at home? · If certain movements seem to trigger the pain, see if you can avoid them while you are pregnant. · Stay active. If your doctor says it's okay, try moderate exercise. Many pregnant women find that water exercise is most comfortable. Examples are swimming and water aerobics. · Ask your doctor about taking acetaminophen for pain. Be safe with medicines. Read and follow all instructions on the label. When should you call for help? Call your doctor now or seek immediate medical care if: 
  · You think you might be in labor.  
  · You have new or worse pain.   
Watch closely for changes in your health, and be sure to contact your doctor if you have any problems. Where can you learn more? Go to http://www.gray.com/ Enter R110 in the search box to learn more about \"Round Ligament Pain: Care Instructions. \" Current as of: February 11, 2020               Content Version: 12.6 © 8594-9471 [employer]. Care instructions adapted under license by [employer] (which disclaims liability or warranty for this information). If you have questions about a medical condition or this instruction, always ask your healthcare professional. [address] any warranty or liability for your use of this information.

## 2021-03-18 LAB
HBV SURFACE AG SER QL: <0.1 INDEX
HBV SURFACE AG SER QL: NEGATIVE
HIV 1+2 AB+HIV1 P24 AG SERPL QL IA: NONREACTIVE
HIV12 RESULT COMMENT, HHIVC: NORMAL
RPR SER QL: REACTIVE
RPR SER-TITR: ABNORMAL {TITER}
RUBV IGG SER-IMP: REACTIVE
RUBV IGG SERPL IA-ACNC: 41.1 IU/ML
VZV IGG SER IA-ACNC: 535 INDEX

## 2021-03-19 LAB
DEPRECATED HGB OTHER BLD-IMP: NORMAL %
HGB A MFR BLD: 97.1 % (ref 96.4–98.8)
HGB A2 MFR BLD COLUMN CHROM: 2.9 % (ref 1.8–3.2)
HGB C MFR BLD: NORMAL %
HGB F MFR BLD: 0 % (ref 0–2)
HGB FRACT BLD-IMP: NORMAL
HGB S BLD QL SOLY: NORMAL
HGB S MFR BLD: 0 %
T PALLIDUM AB SER QL IA: NON REACTIVE

## 2021-03-20 ENCOUNTER — TELEPHONE (OUTPATIENT)
Dept: FAMILY MEDICINE CLINIC | Age: 36
End: 2021-03-20

## 2021-03-20 DIAGNOSIS — R89.9 ABNORMAL LABORATORY TEST RESULT: Primary | ICD-10-CM

## 2021-03-20 NOTE — PROGRESS NOTES
RPR low titer positive, was reflexed to TPal Ab which was negative      - Will repeat TPal Ab in 2-4 weeks      - Will call patient and discuss recent sexual activity and results  Hgb Fractionation wnl, Rubella/VZV immune, Hep B Negative, HIV negative

## 2021-03-20 NOTE — TELEPHONE ENCOUNTER
Spoke with patient about syphilis test results, pt expressed understanding, she is currently sexual active with only one partner. She has not noticed an ulcer. Patient aware that she needs to make lab appt in 2-4wks for follow up syphilis test. She will schedule lab appt after April 4th.

## 2021-03-20 NOTE — TELEPHONE ENCOUNTER
Called patient to discuss lab results, no answer, left message with number for patient to call back. Will attempt to call patient again. as above-s/p debridement 2/25 of sternal area--cough due to dyphagia +/-TBM -now on D3 diet precautions.  multifactorial dyspnea-CAD s/p CABG, PEA arrest, atelectasis due to pain, pleural effusion L-pig tail out, bronchospasm, ?PE-O2 NC sat above 90%                     Pleural effusion-pig tail removed 2/20-CT chest NC-improved but still loculations on left-f/up 4-6 wks  CAD/CHF-diurese as cr allows-keep K/Mg above  atelectasis-pain control, incentive spirometry, acapella                    ? DVT/PE--s/p repeat venous dopplers-negative; VQ unable  bronchospasm-duoneb q 6, pulmicort .5 bid; add tessalon perles 200 q 8 and mucinex;  out pt PFTs      D-daptomycin to zyvox as per ID  snore-? osas--out pt SS  DVT/GI prophylaxis, PT, nutrition evaln            PT      DC planning to rehab.  Mike Hernandez MD-Pulmonary    [phone]

## 2021-03-25 ENCOUNTER — HOSPITAL ENCOUNTER (OUTPATIENT)
Dept: PERINATAL CARE | Age: 36
Discharge: HOME | End: 2021-03-25
Attending: OBSTETRICS & GYNECOLOGY

## 2021-03-25 PROCEDURE — 76811 OB US DETAILED SNGL FETUS: CPT | Performed by: OBSTETRICS & GYNECOLOGY

## 2021-04-08 ENCOUNTER — LAB ONLY (OUTPATIENT)
Dept: FAMILY MEDICINE CLINIC | Age: 36
End: 2021-04-08

## 2021-04-08 DIAGNOSIS — R89.9 ABNORMAL LABORATORY TEST RESULT: ICD-10-CM

## 2021-04-08 LAB — T. PALLIDUM, EXTERNAL: NON REACTIVE

## 2021-04-12 LAB — T PALLIDUM AB SER QL IA: NON REACTIVE

## 2021-04-14 ENCOUNTER — ROUTINE PRENATAL (OUTPATIENT)
Dept: FAMILY MEDICINE CLINIC | Age: 36
End: 2021-04-14

## 2021-04-14 DIAGNOSIS — O09.522 MULTIGRAVIDA OF ADVANCED MATERNAL AGE IN SECOND TRIMESTER: ICD-10-CM

## 2021-04-14 DIAGNOSIS — Z3A.23 23 WEEKS GESTATION OF PREGNANCY: Primary | ICD-10-CM

## 2021-04-14 DIAGNOSIS — Z98.890 HISTORY OF LOOP ELECTRICAL EXCISION PROCEDURE (LEEP): ICD-10-CM

## 2021-04-14 DIAGNOSIS — N83.202 LEFT OVARIAN CYST: ICD-10-CM

## 2021-04-14 PROCEDURE — 0502F SUBSEQUENT PRENATAL CARE: CPT | Performed by: STUDENT IN AN ORGANIZED HEALTH CARE EDUCATION/TRAINING PROGRAM

## 2021-04-14 NOTE — PROGRESS NOTES
Marycarmen Almeida  38 y.o. female  1985  [address] [place] [address]  [id number]    [phone] (home)      [address]:    [facility] Telephone Encounter  Pao Charles MD       Encounter Date: 2021 at 11:20 AM    Consent:  She and/or the health care decision maker is aware that this encounter will be billed as a routine OB appointment and that she may receive a bill for this telephone service, depending on her insurance coverage, and has provided verbal consent to proceed: Yes    Follow-up Prenatal     History of Present Illness   No  used. Contractions: experienced a single contraction, not painful, yesterday  LOF: no  Vaginal bleeding: no  Fetal movement (if >20 wk): yes    Pt had most recent MFM scan on 3/25, cervix was low normal (27.5 cm). Placental previa had resolved. L ovarian cyst unchanged. Pt had positive RPR in her initial prenatal labs. Her confirmatory Tpall was negative. Vitals/Objective:   General: Patient speaking in complete sentences without effort. Normal speech and cooperative. Due to this being a Virtual Check-in/Telephone evaluation, many elements of the physical examination are unable to be assessed. Assessment and Plan:   Marycarmen Almeida is a 38 y.o.  @ 23w5d evaluated by telephone. IOB: O pos, RPR pos, Tpall neg, HepB neg, GC/CT neg, pap NILM, HPV neg.  1. IUP:  NIPT low risk, female. S/p flu. GTT at next visit w/ CBC. Referred to [facility]. Tdap to be given at 28wk. 2. Hx LEEP: 2012 at [facility], normal paps since that time, desires cervical length screening with MFM given risk of cervical insufficiency - early scan appears short but too early to measure; 3/25 scan w/ low normal (27.5 cm). Repeat cervix measurement early third trimester. 3. AMA - NIPT low risk  4. Placenta previa: Resolved on 3/25 scan. Had VB in 1st tri. Seen on early MFM scan. 5. L ovarian cyst: 5cm, unchanged on 3/25 scan, f/u pp  6. Positive RPR - on initial prenatal labs, Tpall for confirmation was negative. 7. Short humerus length - noted on 3/25 scan. NIPT low risk.     -Patient informed of her next appointment: 4/26/2021 in office  -Advised to come in sooner for any concerns  -Pt informed that after 28 wk she will be asked to do weekly home BP monitoring and it was recommended she buy or borrow a cuff ahead of time. Alternative is going to a grocery store/pharmacy to check. One BP should be checked weekly and written in a log >28 weeks.  -Patient was reminded about social distancing and to avoid going into public when possible. Patient understands that this encounter was a temporary measure, and the importance of further follow up and examination was emphasized. Patient verbalized understanding. I affirm this is a Patient Initiated Episode with an Established Patient who has not had a related appointment within my department in the past 7 days or scheduled within the next 24 hours. Note: not billable if this call serves to triage the patient into an appointment for the relevant concern      Electronically Signed: Micheline Cowden, MD  Providers location when delivering service: home      ICD-10-CM ICD-9-CM    1. 23 weeks gestation of pregnancy  Z3A.23 V22.2    2. History of loop electrical excision procedure (LEEP)  Z98.890 V45.89    3. Multigravida of advanced maternal age in second trimester  O09.522 65.56    4. Left ovarian cyst  N83.202 620.2        Pursuant to the emergency declaration under the [facility], [facility] waiver authority and the [employer] and [employer] Act, this Virtual  Visit was conducted, with patient's consent, to reduce the patient's risk of exposure to COVID-19 and provide continuity of care for an established patient. History   Patients past medical, surgical and family histories were personally reviewed and updated. yes    Patient Active Problem List   Diagnosis Code    Threatened , antepartum O20.0    10 weeks gestation of pregnancy Z3A.10    History of loop electrical excision procedure (LEEP) Z98.890    HPV (human papilloma virus) infection B97.7    Multigravida of advanced maternal age in second trimester O09.522    Placenta previa in second trimester O44.02    Left ovarian cyst N83.202              Current Medications/Allergies   Medications and Allergies reviewed:    Current Outpatient Medications   Medication Sig Dispense Refill    prenatal vit-iron fumarate-fa (PRENATAL PLUS with IRON) 28 mg iron- 800 mcg tab Take 1 Tab by mouth daily.        No Known Allergies

## 2021-04-25 NOTE — PROGRESS NOTES
Return OB Visit       Subjective:   Antonette Borjas 38 y.o.   VA: 2021, by Last Menstrual Period  GA:  25w3d. LOF: no  Vaginal bleeding: no  Fetal movement (after 20 weeks): Since around 1pm yesterday has had no fetal movement. Baby was previously very active  Contractions: Yesterday had one painful contraction around 4pm lasted a few seconds    Patient denies fever, chills, headache, dizziness, vision changes, chest pain, shortness of breath, diarrhea, dysuria. Denies foul smelling discharge. No other complaints; doing well otherwise. Allergies- reviewed:  No Known Allergies  Medications- reviewed:   Current Outpatient Medications   Medication Sig    prenatal vit-iron fumarate-fa (PRENATAL PLUS with IRON) 28 mg iron- 800 mcg tab Take 1 Tab by mouth daily. No current facility-administered medications for this visit. Past Medical History- reviewed:  Past Medical History:   Diagnosis Date    Abnormal Papanicolaou smear of cervix 2010    Normal Pap since    History of loop electrical excision procedure (LEEP) 2021    HPV (human papilloma virus) infection 2021     Past Surgical History- reviewed:   No past surgical history on file.   Social History- reviewed:  Social History     Socioeconomic History    Marital status: SINGLE     Spouse name: Not on file    Number of children: Not on file    Years of education: Not on file    Highest education level: Not on file   Occupational History    Not on file   Social Needs    Financial resource strain: Not on file    Food insecurity     Worry: Not on file     Inability: Not on file    Transportation needs     Medical: Not on file     Non-medical: Not on file   Tobacco Use    Smoking status: Never Smoker    Smokeless tobacco: Never Used   Substance and Sexual Activity    Alcohol use: Not Currently    Drug use: Never    Sexual activity: Not on file   Lifestyle    Physical activity     Days per week: Not on file     Minutes per session: Not on file    Stress: Not on file   Relationships    Social connections     Talks on phone: Not on file     Gets together: Not on file     Attends Jainism service: Not on file     Active member of club or organization: Not on file     Attends meetings of clubs or organizations: Not on file     Relationship status: Not on file    Intimate partner violence     Fear of current or ex partner: Not on file     Emotionally abused: Not on file     Physically abused: Not on file     Forced sexual activity: Not on file   Other Topics Concern    Not on file   Social History Narrative    Not on file     Immunizations- reviewed:   Immunization History   Administered Date(s) Administered    Influenza Vaccine Jam[employer]) PF (>6 Mo Flulaval, Fluarix, and >3 Yrs Afluria, Fluzone 98189) 2021       Objective:     Visit Vitals  /69 (BP 1 Location: Left arm, BP Patient Position: Sitting)   Pulse 83   Temp 97.5 °F (36.4 °C) (Temporal)   Resp 16   Ht 5' 2\" (1.575 m)   Wt 153 lb 3.2 oz (69.5 kg)   LMP 10/30/2020 (Exact Date)   SpO2 98%   BMI 28.02 kg/m²       Physical Exam:  GENERAL APPEARANCE: alert, well appearing, in no apparent distress  ABDOMEN: gravid, fundal height 26 cm, FHT present at 144 bpm  PSYCH: normal mood and affect   Bedside US: Baby moving. Mom reassured. Labs  No results found for this or any previous visit (from the past 12 hour(s)). Assessment         ICD-10-CM ICD-9-CM    1. Multigravida of advanced maternal age in second trimester  O09.522 65.56 CBC W/O DIFF      GLUCOSE, GESTATIONAL 1 HR TOLERANCE   2. Left ovarian cyst  N83.202 620.2    3. History of loop electrical excision procedure (LEEP)  Z98.890 V45.89    4. Placenta previa in second trimester  O44.02 641.13          Plan   38 y.o.  25w3d VA 2021, by Last Menstrual Period here for return OB visit      1.  SIUP: PNL: O pos, RPR pos-> Tpall neg x2, HIV/HepB/GC/CT neg, pap NILM, HPV neg, Hgb fractionation wnl, Rubella/VZV immune, initial HGB 14.6. NIPT low risk, female. S/p flu. Referred to [facility] previously. - Routine CBC and 1hr GTT today  - Tdap at next visit  - Follow up in 4 weeks    2. Hx LEEP: 2012 at [facility], normal paps since that time, 3/25 scan w/ low normal (27.5 cm). 3. Decreased fetal movement: for 1 day. Movement present on US. . Mom reassured. 4. AMA: NIPT low risk    5. Placenta previa: Resolved on 3/25 scan. Had VB in 1st tri. Seen on early MFM scan. 6. L ovarian cyst: Unilocular, 5cm, unchanged on 3/25 scan, f/u pp    7. Positive RPR: On initial prenatal labs, Tpall for confirmation was negative. 8. Short humerus length: Noted on 3/25 scan. NIPT low risk. · Continuity provider: Francisco Javier Purcell    Orders Placed This Encounter    CBC W/O DIFF     Standing Status:   Future     Standing Expiration Date:   4/25/2022    GLUCOSE, GESTATIONAL 1 HR TOLERANCE     Standing Status:   Future     Standing Expiration Date:   4/26/2022     Labor precautions discussed, including: Regular painful contractions, lasting for greater than one hour, taking your breath away; any vaginal bleeding; any leakage of fluid; or absent or decreased fetal movement. Call M.D. on call if any of these symptoms or signs occur. I have discussed the diagnosis with the patient and the intended plan as seen in the above orders. The patient has received an after-visit summary and questions were answered concerning future plans. I have discussed medication side effects and warnings with the patient as well. Informed pt to return to the office or go to the ER if she experiences vaginal bleeding, vaginal discharge, leaking of fluid, pelvic cramping.     Pt seen and discussed with Dr. Sterling Velasco (attending physician)    Miranda Gonzalez MD  Family Medicine Resident

## 2021-04-26 ENCOUNTER — ROUTINE PRENATAL (OUTPATIENT)
Dept: FAMILY MEDICINE CLINIC | Age: 36
End: 2021-04-26

## 2021-04-26 VITALS
HEART RATE: 83 BPM | WEIGHT: 153.2 LBS | SYSTOLIC BLOOD PRESSURE: 106 MMHG | HEIGHT: 62 IN | TEMPERATURE: 97.5 F | DIASTOLIC BLOOD PRESSURE: 69 MMHG | OXYGEN SATURATION: 98 % | RESPIRATION RATE: 16 BRPM | BODY MASS INDEX: 28.19 KG/M2

## 2021-04-26 DIAGNOSIS — N83.202 LEFT OVARIAN CYST: ICD-10-CM

## 2021-04-26 DIAGNOSIS — O09.522 MULTIGRAVIDA OF ADVANCED MATERNAL AGE IN SECOND TRIMESTER: Primary | ICD-10-CM

## 2021-04-26 DIAGNOSIS — Z98.890 HISTORY OF LOOP ELECTRICAL EXCISION PROCEDURE (LEEP): ICD-10-CM

## 2021-04-26 DIAGNOSIS — O44.02 PLACENTA PREVIA IN SECOND TRIMESTER: ICD-10-CM

## 2021-04-26 LAB
ERYTHROCYTE [DISTWIDTH] IN BLOOD BY AUTOMATED COUNT: 13 % (ref 11.5–14.5)
GLUCOSE 1H P 100 G GLC PO SERPL-MCNC: 160 MG/DL (ref 65–140)
HCT VFR BLD AUTO: 40.2 % (ref 35–47)
HGB BLD-MCNC: 12.7 G/DL (ref 11.5–16)
MCH RBC QN AUTO: 29 PG (ref 26–34)
MCHC RBC AUTO-ENTMCNC: 31.6 G/DL (ref 30–36.5)
MCV RBC AUTO: 91.8 FL (ref 80–99)
NRBC # BLD: 0 K/UL (ref 0–0.01)
NRBC BLD-RTO: 0 PER 100 WBC
PLATELET # BLD AUTO: 227 K/UL (ref 150–400)
PMV BLD AUTO: 11.9 FL (ref 8.9–12.9)
RBC # BLD AUTO: 4.38 M/UL (ref 3.8–5.2)
WBC # BLD AUTO: 8.4 K/UL (ref 3.6–11)

## 2021-04-26 PROCEDURE — 0502F SUBSEQUENT PRENATAL CARE: CPT | Performed by: STUDENT IN AN ORGANIZED HEALTH CARE EDUCATION/TRAINING PROGRAM

## 2021-04-26 NOTE — PROGRESS NOTES
I reviewed with the resident the medical history and the resident's findings on the physical examination. I discussed with the resident the patient's diagnosis and concur with the plan. 34yo  @ 25w3d by LMP c/w 10wk scan   1. IUP: Rh pos, NIPT low risk, normal anatomy except short humerus - plan is for repeat growth scan 3rd tri, GTT+CBC today   2. Hx LEEP: , normal paps since that time, CLS low normal, stable  3. AMA  4. Ovarian cyst: unchanged from prior scan, can f/up PP  5.   Decreased FM: pt reassured that on bedside US baby very active    Referred to [facility]

## 2021-04-26 NOTE — PROGRESS NOTES
Chief Complaint   Patient presents with    Routine Prenatal Visit     Patient states that she has not had any abnormal abdomen pain, vaginal discharge or loss of fluid/bleeding. Pt does state that baby has not been moving since the afternoon of 4/25/21. 1. Have you been to the ER, urgent care clinic since your last visit? Hospitalized since your last visit? No    2. Have you seen or consulted any other health care providers outside of the [address] [place] [address] since your last visit? Include any pap smears or colon screening.   No

## 2021-04-28 ENCOUNTER — TELEPHONE (OUTPATIENT)
Dept: FAMILY MEDICINE CLINIC | Age: 36
End: 2021-04-28

## 2021-04-28 DIAGNOSIS — O44.02 PLACENTA PREVIA IN SECOND TRIMESTER: Primary | ICD-10-CM

## 2021-04-28 NOTE — PROGRESS NOTES
Patient failed 1 hour GTT. Will need 3 hour GTT. Will call and inform patient and arrange lab appointment.     Douglas Jorge MD

## 2021-04-28 NOTE — TELEPHONE ENCOUNTER
Called and spoke to patient about her 1 hour GTT failure. Explained that she will need a 3 hour GTT which she will need to fast for (8 hours). She will receive a call today or tomorrow to arrange a time and to call the clinic if she does not. Patient expressed understanding and agreement. Thanked me for informing her.     Dede Curling, MD

## 2021-04-29 ENCOUNTER — TELEPHONE (OUTPATIENT)
Dept: FAMILY MEDICINE CLINIC | Age: 36
End: 2021-04-29

## 2021-04-29 NOTE — TELEPHONE ENCOUNTER
----- Message from Andrew Jack MD sent at 4/28/2021  3:29 PM EDT -----  Regarding: lab appointment  Hello,    This patient has failed a 1 hour gtt and will need to come in for a lab appointment for a 3 hour GTT. She will need to arrive fasted (for 8 hours). I will put in the order and inform the patient of her result. Please call patient and arrange a time for her to come in. For Kaycee: Tedcas. I know we didn't see the patient together. I saw her with Dorethia Hammans who is a tech so I randomly picked you to help me with this. Please make sure it gets done if the front office doesn't get around to it. Thank you for your help.  Best regards,  Andrew Jack

## 2021-05-06 ENCOUNTER — TELEPHONE (OUTPATIENT)
Dept: FAMILY MEDICINE CLINIC | Age: 36
End: 2021-05-06

## 2021-05-06 ENCOUNTER — LAB ONLY (OUTPATIENT)
Dept: FAMILY MEDICINE CLINIC | Age: 36
End: 2021-05-06

## 2021-05-06 DIAGNOSIS — O24.410 DIET CONTROLLED GESTATIONAL DIABETES MELLITUS (GDM) IN SECOND TRIMESTER: Primary | ICD-10-CM

## 2021-05-06 DIAGNOSIS — R89.9 ABNORMAL LABORATORY TEST RESULT: Primary | ICD-10-CM

## 2021-05-06 DIAGNOSIS — O44.02 PLACENTA PREVIA IN SECOND TRIMESTER: ICD-10-CM

## 2021-05-06 LAB
GESTATIONAL 3HR GTT,GESTA: ABNORMAL
GLUCOSE 1H P 100 G GLC PO SERPL-MCNC: 188 MG/DL (ref 65–180)
GLUCOSE P FAST SERPL-MCNC: 83 MG/DL (ref 65–95)
GLUCOSE, 2 HR,GSTT2: 160 MG/DL (ref 65–155)
GLUCOSE, 3 HR,GSTT3: 106 MG/DL (ref 65–140)

## 2021-05-06 NOTE — PROGRESS NOTES
Patient became nauseous during 3 hour gestational GTT.  Per verbal order from Dr. Mo Manzo to administer Ondansetron 4mg SL.  [id number]   EXP-SEP 2023  [id number]

## 2021-05-07 RX ORDER — LANCETS
EACH MISCELLANEOUS
Qty: 1 EACH | Refills: 11 | Status: SHIPPED | OUTPATIENT
Start: 2021-05-07

## 2021-05-07 RX ORDER — INSULIN PUMP SYRINGE, 3 ML
EACH MISCELLANEOUS
Qty: 1 KIT | Refills: 0 | Status: SHIPPED | OUTPATIENT
Start: 2021-05-07

## 2021-05-07 NOTE — PROGRESS NOTES
3hr GTT with 2 abnormal values diagnostic for GDM. Patient will need fasting and postprandial glucose monitoring with weekly review of numbers.   - Will call and inform patient  - Will send script for glucometer and related supplies to pharmacy  - Patient will need an appointment for initiation of dietary counseling and instruction on how/when to use glucometer    Alia Sprague MD

## 2021-05-07 NOTE — TELEPHONE ENCOUNTER
Patient called and identity verified with two identifiers. 3hr GTT with 2 abnormal values diagnostic for GDM. Patient will need fasting and postprandial glucose monitoring with weekly review of numbers.  - Patient called and informed.  Pharmacy information obtained  - Script for glucometer and related supplies sent to pharmacy  - Patient will be called to schedule an appointment to discuss diet and lifestyle changes as well as instructions on how and when to use glucometer.     Dede Curling, MD

## 2021-05-11 NOTE — TELEPHONE ENCOUNTER
[phone]  5/11, appt made with patient who speaks [place]. 5/17 with Dr. Carmen Mena. Message    Message Contents   MD Andreas Younger. Please call this patient and schedule her for an in-person visit for follow up on GDM.

## 2021-05-17 ENCOUNTER — ROUTINE PRENATAL (OUTPATIENT)
Dept: FAMILY MEDICINE CLINIC | Age: 36
End: 2021-05-17

## 2021-05-17 VITALS — DIASTOLIC BLOOD PRESSURE: 67 MMHG | BODY MASS INDEX: 28.28 KG/M2 | SYSTOLIC BLOOD PRESSURE: 108 MMHG | WEIGHT: 154.6 LBS

## 2021-05-17 DIAGNOSIS — O24.410 DIET CONTROLLED GESTATIONAL DIABETES MELLITUS (GDM) IN THIRD TRIMESTER: ICD-10-CM

## 2021-05-17 DIAGNOSIS — Z3A.28 28 WEEKS GESTATION OF PREGNANCY: Primary | ICD-10-CM

## 2021-05-17 PROCEDURE — 90715 TDAP VACCINE 7 YRS/> IM: CPT | Performed by: STUDENT IN AN ORGANIZED HEALTH CARE EDUCATION/TRAINING PROGRAM

## 2021-05-17 PROCEDURE — 90471 IMMUNIZATION ADMIN: CPT | Performed by: STUDENT IN AN ORGANIZED HEALTH CARE EDUCATION/TRAINING PROGRAM

## 2021-05-17 PROCEDURE — 0502F SUBSEQUENT PRENATAL CARE: CPT | Performed by: STUDENT IN AN ORGANIZED HEALTH CARE EDUCATION/TRAINING PROGRAM

## 2021-05-17 NOTE — PATIENT INSTRUCTIONS
Please measure your blood sugar Gestational Diabetes Diet: Care Instructions Your Care Instructions Gestational diabetes is a form of diabetes that can happen during pregnancy. It usually goes away after the baby is born. Diabetes means that your pancreas can't make enough insulin or your body does not use insulin properly. Insulin helps sugar enter your cells, where it is used for energy. You may be able to control your blood sugar while you are pregnant by eating a healthy diet and getting regular exercise. A dietitian or certified diabetes educator (CDE) can help you make a food plan. This plan will help control your blood sugar and provide good nutrition for you and your baby. If diet and exercise don't lower or control your blood sugar, you may need diabetes medicine or insulin. Follow-up care is a key part of your treatment and safety. Be sure to make and go to all appointments, and call your doctor if you are having problems. It's also a good idea to know your test results and keep a list of the medicines you take. How can you care for yourself at home? · Learn which foods have carbohydrate. Eating too much carbohydrate will cause your blood sugar to go too high. Carbohydrate foods include: ? Breads, cereals, pasta, and rice. ? Dried beans and starchy vegetables, like corn, peas, and potatoes. ? Fruits and fruit juice, milk, and yogurt. ? Candy, table sugar, soda pop, and drinks sweetened with sugar. · Learn how much carbohydrate you need each day. A dietitian or certified diabetes educator (CDE) can teach you how to keep track of how much carbohydrate you eat. · Try to eat the same amount of carbohydrate at each meal. This will help keep your blood sugar steady. Do not save up your daily allowance of carbohydrate to eat at one meal. 
· Limit foods that have added sugar. This includes candy, desserts, and soda pop.  These foods need to be counted as part of your total carbohydrate intake for the day. · Do not drink alcohol. Alcohol is not safe for you or your baby. · Do not skip meals. Your blood sugar may drop too low if you skip meals and use insulin. · Write down what you eat every day. Review your record with your dietitian or CDE to see if you are eating the right amounts of foods. · Check your blood sugar first thing in the morning before you eat. Then check your blood sugar 1 to 2 hours after the first bite of each meal (or as your doctor recommends). This will help you see how the food you eat affects your blood sugar. Keep track of these levels. Share the record with your doctor. When should you call for help? Watch closely for changes in your health, and be sure to contact your doctor if: 
  · You have questions about your diet.  
  · You often have problems with high or low blood sugar. Where can you learn more? Go to http://www.gray.com/ Enter M291 in the search box to learn more about \"Gestational Diabetes Diet: Care Instructions. \" Current as of: August 31, 2020               Content Version: 12.8 © 7787-7146 [employer]. Care instructions adapted under license by [employer] (which disclaims liability or warranty for this information). If you have questions about a medical condition or this instruction, always ask your healthcare professional. [address] any warranty or liability for your use of this information. Home Blood Sugar Test: About This Test 
What is it? A home blood sugar test measures the amount of sugar (glucose) in your blood, using a small device called a blood sugar meter. It's a quick way to test your blood sugar anywhere, at any time. Why is this test done? Testing your blood sugar helps you know if your levels are in your target range. It helps you know when to take action and may help you avoid blood sugar emergencies.  Testing also helps you learn how things like exercise, stress, and what you eat can affect your blood sugar. What happens before the test? 
The supplies you will need for testing blood sugar include: · A blood glucose meter. · Testing strips. These are made to be used with a specific model of meter. Make sure the strips haven't . · Sugar control solutions. Some meters require a specific solution. Many new meters are made to operate without a control solution. · Short needles called lancets for pricking your skin. · A pen-sized martinez for the lancet (lancet device). It positions the lancet and controls how deeply it goes into your skin. · Clean cotton balls. These are used to stop the bleeding from the testing site. What happens during the test? 
Checking your blood sugar involves pricking your finger, palm, or forearm with a lancet to collect a drop of blood. The blood drop is placed on a test strip, which you insert into the blood glucose meter. The instructions for testing are slightly different for each blood glucose meter model. Follow the instructions that came with your meter. · Wash your hands with warm, soapy water. Dry them well with a clean towel. You may also use an alcohol wipe to clean your finger or other site. But make sure your hands are dry before the test. 
· Insert a clean lancet into the lancet device. · Remove a test strip from the test strip bottle. Replace the lid right away to keep moisture away from the other strips. · Follow the instructions that came with your meter to get it ready. · Use the lancet device to stick the side of your fingertip with the lancet. Do not stick the tip of your finger. Some blood sugar meters use lancet devices that take the blood sample from other sites, such as the palm of the hand or the forearm. But the finger is usually the most accurate place to test blood sugar. · Put a drop of blood on the correct spot on the test strip.  
· Apply pressure with a clean cotton ball to stop the bleeding. · Follow the directions that came with the meter to get the results. · Write down the results and the time that you tested your blood. Some meters will store the results for you. How long does the test take? The blood glucose meter will show the results of the test in a minute or less. What are the possible results for the test? 
The American Diabetes Association (ADA) recommends that you stay within the following blood glucose level ranges. But depending on your health, you and your doctor may set a different range for you. For nonpregnant adults with diabetes · 80 milligrams per deciliter (mg/dL) to 130 mg/dL before a meal 
· Less than 180 mg/dL 1 to 2 hours after a meal 
For women who have diabetes and are pregnant · 95 mg/dL or less before breakfast 
· 120 to 140 mg/dL (or lower) 1 to 2 hours after a meal 
Where can you learn more? Go to http://www.gray.com/ Enter S055 in the search box to learn more about \"Home Blood Sugar Test: About This Test.\" Current as of: August 31, 2020               Content Version: 12.8 © 2006-2021 [employer]. Care instructions adapted under license by [employer] (which disclaims liability or warranty for this information). If you have questions about a medical condition or this instruction, always ask your healthcare professional. [place] [address] any warranty or liability for your use of this information.

## 2021-05-17 NOTE — PROGRESS NOTES
Chief Complaint   Patient presents with    Routine Prenatal Visit     GDM f/u. Getting Tdap today. +FM, no LOF. 1. Have you been to the ER, urgent care clinic since your last visit? Hospitalized since your last visit? no    2. Have you seen or consulted any other health care providers outside of the [address] [place] [address] since your last visit? Include any pap smears or colon screening. No    After obtaining consent, and per orders of Dr. Yesenia Alberts, injection of Tdap given by Rosalinda Salas LPN. Patient instructed to remain in clinic for 20 minutes afterwards, and to report any adverse reaction to me immediately.

## 2021-05-17 NOTE — PROGRESS NOTES
Return OB Visit       Subjective:   Zaida Delaney 39 y.o.  at [facility] [place]:  28w3d, Estimated Date of Delivery: 21 by LMP c/w 10w scan here for follow up on GDM. Patient has not purchased glucometer. Patient has not been measuring daily fasting and 2hr PP glucoses. Patient does not have a log with her. Patient reports feeling well. No new concerns at this time. Patient denies headache, visual disturbances, CP, SOB, RUQ pain, dysuria, and calf tenderness. Pregnancy complicated by GDM, Hx LEEP, AMA, Placenta previa, L ovarian cyst, Positive RPR, Short humerus length. LOF: no  Vaginal bleeding: no  Fetal movement (after 20 weeks): yes  Contractions: had first one this morning  Taking prenatal vitamins: yes      Allergies- reviewed:   No Known Allergies  Medications- reviewed:   Current Outpatient Medications   Medication Sig    Blood-Glucose Meter monitoring kit Please use glucometer to measure your blood sugar 4 times a day. Measure once in the morning before eating or drinking anything and exactly 2 hours after each meal. Keep a written log of the values. Bring the written log with you to every visit.  lancets misc Please use glucometer to measure your blood sugar 4 times a day. Measure once in the morning before eating or drinking anything and exactly 2 hours after each meal. Keep a written log of the values. Bring the written log with you to every visit.  glucose blood VI test strips (ASCENSIA AUTODISC VI, ONE TOUCH ULTRA TEST VI) strip Please use glucometer to measure your blood sugar 4 times a day. Measure once in the morning before eating or drinking anything and exactly 2 hours after each meal. Keep a written log of the values. Bring the written log with you to every visit.  prenatal vit-iron fumarate-fa (PRENATAL PLUS with IRON) 28 mg iron- 800 mcg tab Take 1 Tab by mouth daily. No current facility-administered medications for this visit.       Past Medical History- reviewed:  Past Medical History:   Diagnosis Date    Abnormal Papanicolaou smear of cervix 01/01/2010    Normal Pap since    History of loop electrical excision procedure (LEEP) 1/20/2021    HPV (human papilloma virus) infection 1/20/2021     Past Surgical History- reviewed:   No past surgical history on file.   Social History- reviewed:  Social History     Socioeconomic History    Marital status: SINGLE     Spouse name: Not on file    Number of children: Not on file    Years of education: Not on file    Highest education level: Not on file   Occupational History    Not on file   Social Needs    Financial resource strain: Not on file    Food insecurity     Worry: Not on file     Inability: Not on file    Transportation needs     Medical: Not on file     Non-medical: Not on file   Tobacco Use    Smoking status: Never Smoker    Smokeless tobacco: Never Used   Substance and Sexual Activity    Alcohol use: Not Currently    Drug use: Never    Sexual activity: Not on file   Lifestyle    Physical activity     Days per week: Not on file     Minutes per session: Not on file    Stress: Not on file   Relationships    Social connections     Talks on phone: Not on file     Gets together: Not on file     Attends Rastafari service: Not on file     Active member of club or organization: Not on file     Attends meetings of clubs or organizations: Not on file     Relationship status: Not on file    Intimate partner violence     Fear of current or ex partner: Not on file     Emotionally abused: Not on file     Physically abused: Not on file     Forced sexual activity: Not on file   Other Topics Concern    Not on file   Social History Narrative    Not on file     Immunizations- reviewed:   Immunization History   Administered Date(s) Administered    Influenza Vaccine [employer]) PF (>6 Mo Flulaval, Fluarix, and [address] [facility]) 01/20/2021    Tdap 05/17/2021       OB History- reviewed:  OB History  Para Term  AB Living   3       2     SAB TAB Ectopic Molar Multiple Live Births                    # Outcome Date GA Lbr Jeremiah/2nd Weight Sex Delivery Anes PTL Lv   3 Current            2 AB 18 8w0d    SAB      1 AB 12 8w0d    SAB           Objective:     Visit Vitals  /67   Wt 154 lb 9.6 oz (70.1 kg)   LMP 10/30/2020 (Exact Date)   BMI 28.28 kg/m²       Physical Exam:  GENERAL APPEARANCE: alert, well appearing, in no apparent distress  ABDOMEN: gravid, fundal height 28 cm, FHT present at an average of 144 bpm  PSYCH: normal mood and affect    Labs  No results found for this or any previous visit (from the past 12 hour(s)). Assessment         ICD-10-CM ICD-9-CM    1. 28 weeks gestation of pregnancy  Z3A.28 V22.2 TETANUS, DIPHTHERIA TOXOIDS AND ACELLULAR PERTUSSIS VACCINE (TDAP), IN INDIVIDS. >=7, IM   2. Diet controlled gestational diabetes mellitus (GDM) in third trimester  O24.410 648.83          Plan   39 y.o.  at 28w3d by L[place] (c/w 10w [place] [facility] Estimated Date of Delivery: 21 here for GDM follow up. 1. SIUP: PNL: O pos, RPR pos-> Tpall neg x2, HIV/HepB/GC/CT neg, pap NILM, HPV neg, Hgb fractionation wnl, Rubella/VZV immune, initial HGB 14.6. NIPT low risk, female. S/p flu. Referred to [facility] previously. - TDaP given  - Labor Precautions given (ROM, Painful Contraction every 5 min for > 1hr)  - Patient educated on kick counts (after 28 weeks): baby should move 10X in 2 hours (can be a kick, roll, flutter, swish). - Follow up as below    2. GDM: Diagnosed at previous visit. No H/o GDM. - Counseled patient extensively on GDM, its possible complications (which include but are not limited to macrosomia, birth injury, and fetal demise), how to best control it, diet recommendations, how and when to use glucometer (BG QID fasting +hr PP), how to keep glucose log, glucose log goals (95 fasting, 120 2hr PP).  Patient expressed understanding and agreement  - Follow up scheduled with me in cca 1 week  - Will need growth scan with MFM    3. Hx TNPZ: 1808 at [facility], normal paps since that time, 3/25 scan w/ low normal (27.5 cm) cervical length.     4. AMA: NIPT low risk.     5. Placenta previa: \"Resolved\" on 3/25 scan. Had VB in 1st tri. Seen on early MFM scan.      6. L ovarian cyst: Unilocular, 5cm, unchanged on 3/25 scan, f/u pp     7. Positive RPR: On initial prenatal labs, Tpall neg x2/     8. Short humerus length: Noted on 3/25 scan. NIPT low risk.    ---------------------------------------  · Continuity Provider: Jessica Purcell  ---------------------------------------    Orders Placed This Encounter    TETANUS, DIPHTHERIA TOXOIDS AND ACELLULAR PERTUSSIS VACCINE (TDAP), IN INDIVIDS. >=7, IM     Labor precautions discussed, including: Regular painful contractions, lasting for greater than one hour, taking your breath away; any vaginal bleeding; any leakage of fluid; or absent or decreased fetal movement. Call M.D. on call if any of these symptoms or signs occur. I have discussed the diagnosis with the patient and the intended plan as seen in the above orders. The patient has received an after-visit summary and questions were answered concerning future plans. I have discussed medication side effects and warnings with the patient as well. Informed pt to return to the office or go to the ER if she experiences vaginal bleeding, vaginal discharge, leaking of fluid, pelvic cramping.     Pt seen and discussed with Dr. Sterling Velasco (attending physician)    Miranda Gonzalez MD  Family Medicine Resident

## 2021-05-24 ENCOUNTER — ROUTINE PRENATAL (OUTPATIENT)
Dept: FAMILY MEDICINE CLINIC | Age: 36
End: 2021-05-24

## 2021-05-24 VITALS
DIASTOLIC BLOOD PRESSURE: 65 MMHG | TEMPERATURE: 97.8 F | OXYGEN SATURATION: 98 % | HEIGHT: 62 IN | SYSTOLIC BLOOD PRESSURE: 97 MMHG | RESPIRATION RATE: 17 BRPM | BODY MASS INDEX: 27.94 KG/M2 | HEART RATE: 87 BPM | WEIGHT: 151.8 LBS

## 2021-05-24 DIAGNOSIS — O24.410 DIET CONTROLLED GESTATIONAL DIABETES MELLITUS (GDM) IN THIRD TRIMESTER: ICD-10-CM

## 2021-05-24 DIAGNOSIS — Z34.93 THIRD TRIMESTER PREGNANCY: Primary | ICD-10-CM

## 2021-05-24 PROCEDURE — 0502F SUBSEQUENT PRENATAL CARE: CPT | Performed by: STUDENT IN AN ORGANIZED HEALTH CARE EDUCATION/TRAINING PROGRAM

## 2021-05-24 NOTE — PROGRESS NOTES
I reviewed with the resident the medical history and the resident's findings on the physical examination. I discussed with the resident the patient's diagnosis and concur with the plan. 36yo  @ 29w3d by LMP c/w 10wk scan   1. IUP: Rh pos, NIPT low risk, normal anatomy except short humerus - plan is for repeat growth scan 3rd tri, GTT+CBC today   2. Hx LEEP: , normal paps since that time, CLS low normal, stable  3. AMA  4. Ovarian cyst: unchanged from prior scan, can f/up PP  5. GDM: Discussed risks of uncontrolled glucose including but not limited to macrosomia, birth injury, increased need for , birth defects, miscarriage/fetal demise,  hypoglycemia, persistent diabetes after pregnancy. Log reviewed and looks okay overall.         Referred to [facility]   Seen by [facility]

## 2021-05-24 NOTE — PATIENT INSTRUCTIONS
Gestational Diabetes Diet: Care Instructions Your Care Instructions Gestational diabetes is a form of diabetes that can happen during pregnancy. It usually goes away after the baby is born. Diabetes means that your pancreas can't make enough insulin or your body does not use insulin properly. Insulin helps sugar enter your cells, where it is used for energy. You may be able to control your blood sugar while you are pregnant by eating a healthy diet and getting regular exercise. A dietitian or certified diabetes educator (CDE) can help you make a food plan. This plan will help control your blood sugar and provide good nutrition for you and your baby. If diet and exercise don't lower or control your blood sugar, you may need diabetes medicine or insulin. Follow-up care is a key part of your treatment and safety. Be sure to make and go to all appointments, and call your doctor if you are having problems. It's also a good idea to know your test results and keep a list of the medicines you take. How can you care for yourself at home? · Learn which foods have carbohydrate. Eating too much carbohydrate will cause your blood sugar to go too high. Carbohydrate foods include: ? Breads, cereals, pasta, and rice. ? Dried beans and starchy vegetables, like corn, peas, and potatoes. ? Fruits and fruit juice, milk, and yogurt. ? Candy, table sugar, soda pop, and drinks sweetened with sugar. · Learn how much carbohydrate you need each day. A dietitian or certified diabetes educator (CDE) can teach you how to keep track of how much carbohydrate you eat. · Try to eat the same amount of carbohydrate at each meal. This will help keep your blood sugar steady. Do not save up your daily allowance of carbohydrate to eat at one meal. 
· Limit foods that have added sugar. This includes candy, desserts, and soda pop. These foods need to be counted as part of your total carbohydrate intake for the day.  
· Do not drink alcohol. Alcohol is not safe for you or your baby. · Do not skip meals. Your blood sugar may drop too low if you skip meals and use insulin. · Write down what you eat every day. Review your record with your dietitian or CDE to see if you are eating the right amounts of foods. · Check your blood sugar first thing in the morning before you eat. Then check your blood sugar 1 to 2 hours after the first bite of each meal (or as your doctor recommends). This will help you see how the food you eat affects your blood sugar. Keep track of these levels. Share the record with your doctor. When should you call for help? Watch closely for changes in your health, and be sure to contact your doctor if: 
  · You have questions about your diet.  
  · You often have problems with high or low blood sugar. Where can you learn more? Go to http://www.gray.com/ Enter M291 in the search box to learn more about \"Gestational Diabetes Diet: Care Instructions. \" Current as of: August 31, 2020               Content Version: 12.8 © 2006-2021 [employer]. Care instructions adapted under license by [employer] (which disclaims liability or warranty for this information). If you have questions about a medical condition or this instruction, always ask your healthcare professional. [address] any warranty or liability for your use of this information.

## 2021-05-24 NOTE — PROGRESS NOTES
Return OB Visit       Subjective:   Twan Parikh 39 y.o.  at [facility] [place]:  29w3d, Estimated Date of Delivery: 21 by LMP and c/w  10 week US    Patient reports feeling well. No new concerns at this time. Patient denies headache, visual disturbances, CP, SOB, RUQ pain, dysuria, and calf tenderness. Pregnancy complicated by GDM, Hx LEEP, AMA, Placenta previa (R), L ovarian cyst, Positive RPR, Short humerus length    OB History:  See Chart    LOF: no  Vaginal bleeding: no  Fetal movement (after 20 weeks): yes  Contractions: no  Taking prenatal vitamins: yes      Allergies- reviewed:   No Known Allergies  Medications- reviewed:   Current Outpatient Medications   Medication Sig    Blood-Glucose Meter monitoring kit Please use glucometer to measure your blood sugar 4 times a day. Measure once in the morning before eating or drinking anything and exactly 2 hours after each meal. Keep a written log of the values. Bring the written log with you to every visit.  lancets misc Please use glucometer to measure your blood sugar 4 times a day. Measure once in the morning before eating or drinking anything and exactly 2 hours after each meal. Keep a written log of the values. Bring the written log with you to every visit.  glucose blood VI test strips (ASCENSIA AUTODISC VI, ONE TOUCH ULTRA TEST VI) strip Please use glucometer to measure your blood sugar 4 times a day. Measure once in the morning before eating or drinking anything and exactly 2 hours after each meal. Keep a written log of the values. Bring the written log with you to every visit.  prenatal vit-iron fumarate-fa (PRENATAL PLUS with IRON) 28 mg iron- 800 mcg tab Take 1 Tab by mouth daily. No current facility-administered medications for this visit.      Past Medical History- reviewed:  Past Medical History:   Diagnosis Date    Abnormal Papanicolaou smear of cervix 2010    Normal Pap since    History of loop electrical excision procedure (LEEP) 2021    HPV (human papilloma virus) infection 2021     Past Surgical History- reviewed:   No past surgical history on file. Social History- reviewed:  Social History     Socioeconomic History    Marital status: SINGLE     Spouse name: Not on file    Number of children: Not on file    Years of education: Not on file    Highest education level: Not on file   Occupational History    Not on file   Tobacco Use    Smoking status: Never Smoker    Smokeless tobacco: Never Used   Substance and Sexual Activity    Alcohol use: Not Currently    Drug use: Never    Sexual activity: Not on file   Other Topics Concern    Not on file   Social History Narrative    Not on file     Social Determinants of Health     Financial Resource Strain:     Difficulty of Paying Living Expenses:    Food Insecurity:     Worried About Running Out of Food in the Last Year:     [address] in the Last Year:    Transportation Needs:     Lack of Transportation (Medical):      Lack of Transportation (Non-Medical):    Physical Activity:     Days of Exercise per Week:     Minutes of Exercise per Session:    Stress:     Feeling of Stress :    Social Connections:     Frequency of Communication with Friends and Family:     Frequency of Social Gatherings with Friends and Family:     Attends Yazidism Services:     Active Member of Clubs or Organizations:     Attends Club or Organization Meetings:     Marital Status:    Intimate Partner Violence:     Fear of Current or Ex-Partner:     Emotionally Abused:     Physically Abused:     Sexually Abused:      Immunizations- reviewed:   Immunization History   Administered Date(s) Administered    Influenza Vaccine (Qu[employer]) PF (>6 Mo Flulaval, Fluarix, and >3 Yrs Erma [place] [id number]) 2021    Tdap 2021     OB History- reviewed:  OB History    Para Term  AB Living   3       2     SAB TAB Ectopic Molar Multiple Live Births # Outcome Date GA Lbr Jeremiah/2nd Weight Sex Delivery Anes PTL Lv   3 Current            2 AB 18 8w0d    SAB      1 AB 12 8w0d    SAB           Objective:     Visit Vitals  BP 97/65   Pulse 87   Temp 97.8 °F (36.6 °C) (Temporal)   Resp 17   Ht 5' 2\" (1.575 m)   Wt 151 lb 12.8 oz (68.9 kg)   LMP 10/30/2020 (Exact Date)   SpO2 98%   BMI 27.76 kg/m²       Physical Exam:  GENERAL APPEARANCE: alert, well appearing, in no apparent distress  ABDOMEN: gravid, fundal height 31 cm, FHT present at an average of 142 bpm  PSYCH: normal mood and affect    Labs  No results found for this or any previous visit (from the past 12 hour(s)). Assessment         ICD-10-CM ICD-9-CM    1. Third trimester pregnancy  Z34.93 V22.2    2. Diet controlled gestational diabetes mellitus (GDM) in third trimester  O24.410 648.83          Plan   39 y.o.  at 29w3d by LMP (c/w 10w [place]), [facility] Estimated Date of Delivery: 21 here for return OB visit.     1. SIUP: PNL: O pos, RPR pos->Tpall neg x2, HIV/HepB/GC/CT neg, pap NILM, HPV neg, Hgb fractionation wnl, Rubella/VZV immune, HGB 14.6-> 12.7. NIPT low risk, female. S/p TDaP/flu. Referred to [facility] previously. - Labor Precautions given (ROM, Painful Contraction every 5 min for > 1hr)  - Patient educated on kick counts (after 28 weeks): baby should move 10X in 2 hours (can be a kick, roll, flutter, swish). - Follow up in 1 week     2. GDM: No prior H/o GDM. - Log reviewed. Acceptable. - Discussed risks of uncontrolled diabetes which include but are not limited to macrosomia, birth injury, and fetal demise. 3. Hx CLBP: 7827 at [facility], normal paps since that time, 3/25 scan w/ low normal (27.5 cm) cervical length.     4. AMA: NIPT low risk.     5. Placenta previa: \"Resolved\" on 3/25 scan. Had VB in 1st tri. Seen on early MFM scan.      6. L ovarian cyst: Unilocular, 5cm, unchanged on 3/25 scan, f/u pp     7. Positive RPR: On initial prenatal labs, Tpall neg x2     8. Short humerus length: Noted on 3/25 scan. NIPT low risk. - Scheduled third trimester growth scan to follow up    ---------------------------------------  · Continuity Provider: Manda Everett  · Circ:  N/a female  ---------------------------------------    No orders of the defined types were placed in this encounter. Labor precautions discussed, including: Regular painful contractions, lasting for greater than one hour, taking your breath away; any vaginal bleeding; any leakage of fluid; or absent or decreased fetal movement. Call M.D. on call if any of these symptoms or signs occur. I have discussed the diagnosis with the patient and the intended plan as seen in the above orders. The patient has received an after-visit summary and questions were answered concerning future plans. I have discussed medication side effects and warnings with the patient as well. Informed pt to return to the office or go to the ER if she experiences vaginal bleeding, vaginal discharge, leaking of fluid, pelvic cramping.     Pt seen and discussed with Dr. Bronson Gitelman (attending physician)    Andrew Jack MD  Family Medicine Resident

## 2021-05-24 NOTE — PROGRESS NOTES
Chief Complaint   Patient presents with    Routine Prenatal Visit     .  29w 3d today. No bleeding or LOF.  +FM. 1. Have you been to the ER, urgent care clinic since your last visit? Hospitalized since your last visit? No    2. Have you seen or consulted any other health care providers outside of the [address] [place] [address] since your last visit? Include any pap smears or colon screening.  No

## 2021-06-02 ENCOUNTER — HOSPITAL ENCOUNTER (OUTPATIENT)
Dept: PERINATAL CARE | Age: 36
Discharge: HOME | End: 2021-06-02
Attending: OBSTETRICS & GYNECOLOGY
Payer: SELF-PAY

## 2021-06-02 PROCEDURE — 76816 OB US FOLLOW-UP PER FETUS: CPT | Performed by: OBSTETRICS & GYNECOLOGY

## 2021-06-07 ENCOUNTER — ROUTINE PRENATAL (OUTPATIENT)
Dept: FAMILY MEDICINE CLINIC | Age: 36
End: 2021-06-07

## 2021-06-07 DIAGNOSIS — Z98.890 HISTORY OF LOOP ELECTRICAL EXCISION PROCEDURE (LEEP): ICD-10-CM

## 2021-06-07 DIAGNOSIS — O09.523 MULTIGRAVIDA OF ADVANCED MATERNAL AGE IN THIRD TRIMESTER: ICD-10-CM

## 2021-06-07 DIAGNOSIS — Z3A.31 31 WEEKS GESTATION OF PREGNANCY: Primary | ICD-10-CM

## 2021-06-07 DIAGNOSIS — O24.410 DIET CONTROLLED GESTATIONAL DIABETES MELLITUS (GDM) IN THIRD TRIMESTER: ICD-10-CM

## 2021-06-07 PROCEDURE — 0502F SUBSEQUENT PRENATAL CARE: CPT | Performed by: STUDENT IN AN ORGANIZED HEALTH CARE EDUCATION/TRAINING PROGRAM

## 2021-06-07 NOTE — PROGRESS NOTES
I reviewed with the resident the medical history and the resident's findings on the physical examination. I discussed with the resident the patient's diagnosis and concur with the plan. 36yo  @ 31w3d by LMP c/w 10wk scan   1. IUP: Rh pos, NIPT low risk, normal anatomy except short humerus - plan is for repeat growth scan 3rd tri, third tri cbc ok, needs antibody screen  2. Hx LEEP: , normal paps since that time, CLS low normal, stable  3. AMA  4. Ovarian cyst: unchanged from prior scan, can f/up PP  5. GDM: Discussed risks of uncontrolled glucose including but not limited to macrosomia, birth injury, increased need for , birth defects, miscarriage/fetal demise,  hypoglycemia, persistent diabetes after pregnancy. Log sounds ok overall.    6.  False pos RPR       Referred to [facility]   Seen by [facility]

## 2021-06-07 NOTE — PROGRESS NOTES
Olamide Roy  36 y.o. female  1985  [address] [address]  [id number]    [phone] (home)      [address]:    [facility] Telephone Encounter  Milagros Jernigan MD       Encounter Date: 2021 at 10:49 AM    Consent:  She and/or the health care decision maker is aware that this encounter will be billed as a routine OB appointment and that she may receive a bill for this telephone service, depending on her insurance coverage, and has provided verbal consent to proceed: Yes    Follow-up Prenatal     History of Present Illness   Used . Contractions: no  LOF: no  Vaginal bleeding: no  Fetal movement: yes    Fasting BG 79-97 and postprandial BG . Eating oatmeal, fruit, protein, quinoa, or vegetables for most meals. Walks or other aerobics daily. Vitals/Objective:   General: Patient speaking in complete sentences without effort. Normal speech and cooperative. Due to this being a Virtual Check-in/Telephone evaluation, many elements of the physical examination are unable to be assessed. Assessment and Plan:   Olamide Roy is a 39 y.o.  @ 31w3d evaluated by telephone. SIUP at 31w3d:  -PNL: O+, Ab screen not done. HIV, Hep B, GC/Chlamydia neg. RPR positive low titers; Tpall neg x2. VZV, Rubella immune. Hgb 14.6, Hgb fractionation nml. Pap NILM. Urine cx neg. -NIPT low risk  -Anatomy scan w/ low nml cervical length, short humerus length, and unchanged left ovarian cyst  -S/p flu and tdap  -Consider obtaining Ab screen next visit     GDM: Failed 1hr and 3hr GTT. EFW 41%ile on  growth scan.  BG log at goal. Discussed risks of uncontrolled glucose including but not limited to macrosomia, birth injury, increased need for , birth defects, miscarriage/fetal demise,  hypoglycemia, persistent diabetes after pregnancy.  -Continue lifestyle modifications   -F/u with MFM on  for repeat growth scan    Hx ATDD: 2633 at [facility], normal paps since that time, 3/25 scan w/ low normal (27.5 cm) cervical length.     AMA: NIPT low risk. -F/u with MFM     Placenta previa: Resolved on 3/25 scan. Had VB in 1st tri. Seen on early MFM scan.      L ovarian cyst: Unilocular, 5cm, unchanged on 3/25 scan.  -F/u postpartum     Positive RPR: On initial prenatal labs, Tpall neg x2     Short humerus length: Noted on 3/25 scan. NIPT low risk. - F/u with MFM for growth scan    -Patient informed of her next appointment: 6/21/2021 (confirm if telephone or in office and inform patient)  -Advised to come in sooner for any concerns  -Pt informed that after 28 wk she will be asked to do weekly home BP monitoring and it was recommended she buy or borrow a cuff ahead of time. Alternative is going to a grocery store/pharmacy to check. One BP should be checked weekly and written in a log >28 weeks.  -Patient educated on kick counts (after 28 weeks): baby should move 10 times in 2 hours (can be a kick, roll, flutter, swish). -Patient was reminded about social distancing and to avoid going into public when possible. Patient understands that this encounter was a temporary measure, and the importance of further follow up and examination was emphasized. Patient verbalized understanding. I affirm this is a Patient Initiated Episode with an Established Patient who has not had a related appointment within my department in the past 7 days or scheduled within the next 24 hours. Note: not billable if this call serves to triage the patient into an appointment for the relevant concern      Electronically Signed: Drake Rojas MD,  resident  Providers location when delivering service: home      ICD-10-CM ICD-9-CM    1. 31 weeks gestation of pregnancy  Z3A.31 V22.2    2. Diet controlled gestational diabetes mellitus (GDM) in third trimester  O24.410 648.83    3. History of loop electrical excision procedure (LEEP)  Z98.890 V45.89    4. Multigravida of advanced maternal age in third trimester  O09.523 65.56        Pursuant to the emergency declaration under the [address] [place] [address] [facility] authority and the [employer] Act, this Virtual  Visit was conducted, with patient's consent, to reduce the patient's risk of exposure to COVID-19 and provide continuity of care for an established patient. History   Patients past medical, surgical and family histories were personally reviewed and updated. yes    Patient Active Problem List   Diagnosis Code    Threatened , antepartum O20.0    10 weeks gestation of pregnancy Z3A.10    History of loop electrical excision procedure (LEEP) Z98.890    HPV (human papilloma virus) infection B97.7    Multigravida of advanced maternal age in second trimester O09.522    Placenta previa in second trimester O44.02    Left ovarian cyst N83.202              Current Medications/Allergies   Medications and Allergies reviewed:    Current Outpatient Medications   Medication Sig Dispense Refill    Blood-Glucose Meter monitoring kit Please use glucometer to measure your blood sugar 4 times a day. Measure once in the morning before eating or drinking anything and exactly 2 hours after each meal. Keep a written log of the values. Bring the written log with you to every visit. 1 Kit 0    lancets misc Please use glucometer to measure your blood sugar 4 times a day. Measure once in the morning before eating or drinking anything and exactly 2 hours after each meal. Keep a written log of the values. Bring the written log with you to every visit. 1 Each 11    glucose blood VI test strips (ASCENSIA AUTODISC VI, ONE TOUCH ULTRA TEST VI) strip Please use glucometer to measure your blood sugar 4 times a day.  Measure once in the morning before eating or drinking anything and exactly 2 hours after each meal. Keep a written log of the values. Bring the written log with you to every visit. 42 Strip 11    prenatal vit-iron fumarate-fa (PRENATAL PLUS with IRON) 28 mg iron- 800 mcg tab Take 1 Tab by mouth daily.        No Known Allergies

## 2021-06-21 ENCOUNTER — ROUTINE PRENATAL (OUTPATIENT)
Dept: FAMILY MEDICINE CLINIC | Age: 36
End: 2021-06-21

## 2021-06-21 VITALS
BODY MASS INDEX: 27.57 KG/M2 | DIASTOLIC BLOOD PRESSURE: 66 MMHG | SYSTOLIC BLOOD PRESSURE: 100 MMHG | RESPIRATION RATE: 16 BRPM | HEIGHT: 62 IN | WEIGHT: 149.8 LBS | HEART RATE: 82 BPM | OXYGEN SATURATION: 98 % | TEMPERATURE: 98.4 F

## 2021-06-21 DIAGNOSIS — O09.522 MULTIGRAVIDA OF ADVANCED MATERNAL AGE IN SECOND TRIMESTER: ICD-10-CM

## 2021-06-21 DIAGNOSIS — Z98.890 HISTORY OF LOOP ELECTRICAL EXCISION PROCEDURE (LEEP): ICD-10-CM

## 2021-06-21 DIAGNOSIS — O24.410 DIET CONTROLLED GESTATIONAL DIABETES MELLITUS (GDM) IN THIRD TRIMESTER: ICD-10-CM

## 2021-06-21 DIAGNOSIS — Z3A.33 33 WEEKS GESTATION OF PREGNANCY: Primary | ICD-10-CM

## 2021-06-21 DIAGNOSIS — N83.202 LEFT OVARIAN CYST: ICD-10-CM

## 2021-06-21 DIAGNOSIS — O44.02 PLACENTA PREVIA IN SECOND TRIMESTER: ICD-10-CM

## 2021-06-21 LAB
ANTIBODY SCREEN, EXTERNAL: NEGATIVE
BLOOD BANK CMNT PATIENT-IMP: NORMAL
BLOOD GROUP ANTIBODIES SERPL: NORMAL

## 2021-06-21 PROCEDURE — 0502F SUBSEQUENT PRENATAL CARE: CPT | Performed by: STUDENT IN AN ORGANIZED HEALTH CARE EDUCATION/TRAINING PROGRAM

## 2021-06-21 PROCEDURE — 59426 ANTEPARTUM CARE ONLY: CPT | Performed by: FAMILY MEDICINE

## 2021-06-21 NOTE — PROGRESS NOTES
I reviewed with the resident the medical history and the resident's findings on the physical examination. I discussed with the resident the patient's diagnosis and concur with the plan. 34yo  @ 33w3d by LMP c/w 10wk scan   1. IUP: Rh pos, NIPT low risk, normal anatomy except short humerus - plan is for repeat growth scan 3rd tri, third tri cbc ok, needs antibody screen  2. Hx LEEP: , normal paps since that time, CLS low normal, stable  3. AMA  4. Ovarian cyst: unchanged from prior scan, can f/up PP  5. GDM: Discussed risks of uncontrolled glucose including but not limited to macrosomia, birth injury, increased need for , birth defects, miscarriage/fetal demise,  hypoglycemia, persistent diabetes after pregnancy. Log sounds ok overall. 6.  False pos RPR   7.   Ovarian cyst: simple, can rescan after pregnancy      Referred to [facility]   Seen by [facility]

## 2021-06-21 NOTE — PROGRESS NOTES
Quynh Marley is a 39 y.o. female    Chief Complaint   Patient presents with    Routine Prenatal Visit     Patient is 33 weeks and 3 days. Patient brought in her sugar logs. She is having vaginal bleeding or discharge. She is taking prenatal vitamins. She is having fetal movement. She states that she has 5-6 contractions a day. No other concerns       1. Have you been to the ER, urgent care clinic since your last visit? Hospitalized since your last visit? No  M  2. Have you seen or consulted any other health care providers outside of the [address] [place] [address] since your last visit? Include any pap smears or colon screening. No      Visit Vitals  /66 (BP 1 Location: Right upper arm, BP Patient Position: Sitting)   Pulse 82   Temp 98.4 °F (36.9 °C) (Oral)   Resp 16   Ht 5' 2\" (1.575 m)   Wt 149 lb 12.8 oz (67.9 kg)   SpO2 98%   BMI 27.40 kg/m²           Health Maintenance Due   Topic Date Due    Hepatitis C Screening  Never done    COVID-19 Vaccine (1) Never done         Medication Reconciliation completed, changes noted. Please  Update medication list.  Vaginal Bleeding: No  Vaginal discharge: No  Fetal movement: Yes  Contractions: Yes, 5-6 a day  Prenatal Vitamins: yes.

## 2021-06-21 NOTE — PATIENT INSTRUCTIONS
Weeks 32 to 34 of Your Pregnancy: Care Instructions Overview During the last few weeks of your pregnancy, you may have more aches and pains. It's important to rest when you can. Your growing baby is putting more pressure on your bladder. So you may need to urinate more often. Hemorrhoids are also common. These are painful, itchy veins in the rectal area. You may want to talk with your doctor about banking your baby's umbilical cord blood. This is the blood left in the cord after birth. If you want to save this blood, you must arrange it ahead of time. You can't decide at the last minute. If you haven't already had the Tdap shot during this pregnancy, talk to your doctor about getting it. It will help protect your  against pertussis infection. Follow-up care is a key part of your treatment and safety. Be sure to make and go to all appointments, and call your doctor if you are having problems. It's also a good idea to know your test results and keep a list of the medicines you take. How can you care for yourself at home? Ease hemorrhoids · Get more liquids, fruits, vegetables, and fiber in your diet. This will help keep your stools soft. · Avoid sitting for too long. Lie on your left side several times a day. · Clean yourself with soft, moist toilet paper. Or you can use witch hazel pads or personal hygiene pads. · If you are uncomfortable, try ice packs. Or you can sit in a warm sitz bath. Do these for 20 minutes at a time, as needed. · Use hydrocortisone cream for pain and itching. Two examples are Anusol and Preparation H Hydrocortisone. · Ask your doctor about taking an over-the-counter stool softener. Consider breastfeeding · Experts recommend that women breastfeed for 1 year or longer. · Breast milk may help protect your child from some health problems.  babies are less likely than formula-fed babies to: 
? Get ear infections, colds, diarrhea, and pneumonia. ?  Be obese or get diabetes later in life. · Women who breastfeed have less bleeding after the birth. Their uteruses also shrink back faster. · Some women who breastfeed lose weight faster. Making milk burns calories. · Breastfeeding can lower your risk of breast cancer, ovarian cancer, and osteoporosis. Decide about circumcision for boys · As you make this decision, it may help to think about your personal, Rastafarian, and family traditions. You get to decide if you will keep your son's penis natural or if he will be circumcised. · If you decide that you would like to have your baby circumcised, talk with your doctor. You can share your concerns about pain. And you can discuss your preferences for anesthesia. Where can you learn more? Go to http://www.gray.com/ Enter S787 in the search box to learn more about \"Weeks 32 to 34 of Your Pregnancy: Care Instructions. \" Current as of: October 8, 2020               Content Version: 12.8 © 4267-4652 [employer]. Care instructions adapted under license by [employer] (which disclaims liability or warranty for this information). If you have questions about a medical condition or this instruction, always ask your healthcare professional. [address] [address] any warranty or liability for your use of this information.

## 2021-06-21 NOTE — PROGRESS NOTES
Return OB Visit       Subjective:   Theresa Stevens 39 y.o.   VA: 2021, by Last Menstrual Period  GA: 33w3d     LOF: no  Vaginal bleeding: no  Fetal movement (after 20 weeks): yes  Contractions: 5-6 times per day  PNV: yes    GDMA1:   - Fasting pp: 81-97 (one reading of 117)  - Breakfast pp:   - Lunch pp: 73-80  - Dinner pp:  (one reading of 140)      OB History    Para Term  AB Living   3       2     SAB TAB Ectopic Molar Multiple Live Births                    # Outcome Date GA Lbr Jeremiah/2nd Weight Sex Delivery Anes PTL Lv   3 Current            2 AB 18 8w0d    SAB      1 AB 12 8w0d    SAB          Allergies- reviewed: Allergies   Allergen Reactions    Peanut Butter Flavor Anaphylaxis     Medications- reviewed:   Current Outpatient Medications   Medication Sig    Blood-Glucose Meter monitoring kit Please use glucometer to measure your blood sugar 4 times a day. Measure once in the morning before eating or drinking anything and exactly 2 hours after each meal. Keep a written log of the values. Bring the written log with you to every visit.  lancets misc Please use glucometer to measure your blood sugar 4 times a day. Measure once in the morning before eating or drinking anything and exactly 2 hours after each meal. Keep a written log of the values. Bring the written log with you to every visit.  glucose blood VI test strips (ASCENSIA AUTODISC VI, ONE TOUCH ULTRA TEST VI) strip Please use glucometer to measure your blood sugar 4 times a day. Measure once in the morning before eating or drinking anything and exactly 2 hours after each meal. Keep a written log of the values. Bring the written log with you to every visit.  prenatal vit-iron fumarate-fa (PRENATAL PLUS with IRON) 28 mg iron- 800 mcg tab Take 1 Tab by mouth daily. No current facility-administered medications for this visit.      Past Medical History- reviewed:  Past Medical History: Diagnosis Date    Abnormal Papanicolaou smear of cervix 01/01/2010    Normal Pap since    History of loop electrical excision procedure (LEEP) 1/20/2021    HPV (human papilloma virus) infection 1/20/2021     Past Surgical History- reviewed:   History reviewed. No pertinent surgical history. Social History- reviewed:  Social History     Socioeconomic History    Marital status: SINGLE     Spouse name: Not on file    Number of children: Not on file    Years of education: Not on file    Highest education level: Not on file   Occupational History    Not on file   Tobacco Use    Smoking status: Never Smoker    Smokeless tobacco: Never Used   Substance and Sexual Activity    Alcohol use: Not Currently    Drug use: Never    Sexual activity: Not on file   Other Topics Concern    Not on file   Social History Narrative    Not on file     Social Determinants of Health     Financial Resource Strain:     Difficulty of Paying Living Expenses:    Food Insecurity:     Worried About Running Out of Food in the Last Year:     [address] in the Last Year:    Transportation Needs:     Lack of Transportation (Medical):      Lack of Transportation (Non-Medical):    Physical Activity:     Days of Exercise per Week:     Minutes of Exercise per Session:    Stress:     Feeling of Stress :    Social Connections:     Frequency of Communication with Friends and Family:     Frequency of Social Gatherings with Friends and Family:     Attends Sikhism Services:     Active Member of Clubs or Organizations:     Attends Club or Organization Meetings:     Marital Status:    Intimate Partner Violence:     Fear of Current or Ex-Partner:     Emotionally Abused:     Physically Abused:     Sexually Abused:      Immunizations- reviewed:   Immunization History   Administered Date(s) Administered    Influenza Vaccine Cyzone) PF (>6 Mo Flulaval, Fluarix, and [address] [facility]) 01/20/2021    Tdap 05/17/2021 Objective:     Visit Vitals  /66 (BP 1 Location: Right upper arm, BP Patient Position: Sitting)   Pulse 82   Temp 98.4 °F (36.9 °C) (Oral)   Resp 16   Ht 5' 2\" (1.575 m)   Wt 149 lb 12.8 oz (67.9 kg)   LMP 10/30/2020 (Exact Date)   SpO2 98%   BMI 27.40 kg/m²       Physical Exam:  General: NAD  Cardiac: RRR, no murmurs, rubs, or gallops  Respiratory: CTA b/l, no wheezes, rhonchi, rales  Abdomen: gravid, fundal height 33cm, FHT present at 150  Extremities: No LE tenderness or edema. Psych: Normal mood and affect. Labs  No results found for this or any previous visit (from the past 12 hour(s)). Assessment         ICD-10-CM ICD-9-CM    1. 33 weeks gestation of pregnancy  Z3A.33 V22.2 ANTIBODY SCREEN   2. Diet controlled gestational diabetes mellitus (GDM) in third trimester  O24.410 648.83    3. History of loop electrical excision procedure (LEEP)  Z98.890 V45.89    4. Multigravida of advanced maternal age in second trimester  O09.522 65.56    5. Placenta previa in second trimester  O44.02 641.13    6. Left ovarian cyst  N83.202 620.2          Plan   39 y.o.  at 33w3d VA 2021, by Last Menstrual Period (c/w 10w US) here for return OB visit     IUP: at 33w3d  - PNL: O+, Ab not collected, Hgb frc normal, HIV NR, RPR reactive (TPall neg x2), HBsAg neg, GC/CT neg, VZV immune, Rubella immune, pap NILM HPV neg  - NIPT low risk, Horizon neg  - S/p flu, Tdap  - US at 30w5d: EFW 41%, anterior placenta, normal anatomy  - Will obtain Ab screen today  - Follow up on 21    GDM: Failed 1hr and 3hr GTT. EFW 41%ile on  growth scan. BG log mostly at goal. Discussed risks.   -Continue lifestyle modifications   -F/u with MFM on  for repeat growth scan    Hx LEEP:  at [facility], normal paps since that time, 3/25 scan w/ low normal (27.5 cm) cervical length.     AMA: NIPT low risk. - F/u with MFM     Placenta previa: Resolved on 3/25 scan. Had VB in 1st tri.  Seen on early MFM scan.      L ovarian cyst: Unilocular, 5cm, unchanged on subsequent scans  -F/u postpartum     Positive RPR: On initial prenatal labs, Tpall neg x2     Short humerus length: Noted on 3/25 scan. NIPT low risk. - F/u with MFM for growth scan      Orders Placed This Encounter    ANTIBODY SCREEN     Standing Status:   Future     Standing Expiration Date:   6/20/2022     Labor precautions discussed, including: Regular painful contractions, lasting for greater than one hour, taking your breath away; any vaginal bleeding; any leakage of fluid; or absent or decreased fetal movement. Call M.D. on call if any of these symptoms or signs occur. I have discussed the diagnosis with the patient and the intended plan as seen in the above orders. The patient has received an after-visit summary and questions were answered concerning future plans. I have discussed medication side effects and warnings with the patient as well. Informed pt to return to the office or go to the ER if she experiences vaginal bleeding, vaginal discharge, leaking of fluid, pelvic cramping.     Pt seen and discussed with Dr. Ray Romero (attending physician)    Deanne Black DO  Family Medicine Resident

## 2021-06-30 ENCOUNTER — HOSPITAL ENCOUNTER (OUTPATIENT)
Dept: PERINATAL CARE | Age: 36
Discharge: HOME | End: 2021-06-30
Attending: OBSTETRICS & GYNECOLOGY
Payer: SELF-PAY

## 2021-06-30 PROCEDURE — 76820 UMBILICAL ARTERY ECHO: CPT | Performed by: OBSTETRICS & GYNECOLOGY

## 2021-06-30 PROCEDURE — 76819 FETAL BIOPHYS PROFIL W/O NST: CPT | Performed by: OBSTETRICS & GYNECOLOGY

## 2021-06-30 PROCEDURE — 76816 OB US FOLLOW-UP PER FETUS: CPT | Performed by: OBSTETRICS & GYNECOLOGY

## 2021-07-06 ENCOUNTER — HOSPITAL ENCOUNTER (INPATIENT)
Age: 36
LOS: 4 days | Discharge: HOME | End: 2021-07-10
Attending: FAMILY MEDICINE | Admitting: OBSTETRICS & GYNECOLOGY

## 2021-07-06 ENCOUNTER — TELEPHONE (OUTPATIENT)
Dept: FAMILY MEDICINE CLINIC | Age: 36
End: 2021-07-06

## 2021-07-06 DIAGNOSIS — Z34.90 PREGNANCY, UNSPECIFIED GESTATIONAL AGE: ICD-10-CM

## 2021-07-06 PROBLEM — O42.919 PRETERM PREMATURE RUPTURE OF MEMBRANES: Status: ACTIVE | Noted: 2021-07-06

## 2021-07-06 LAB
BASOPHILS # BLD: 0.1 K/UL (ref 0–0.1)
BASOPHILS NFR BLD: 1 % (ref 0–1)
COMMENT, HOLDF: NORMAL
COVID-19 RAPID TEST, COVR: NOT DETECTED
DIFFERENTIAL METHOD BLD: ABNORMAL
EOSINOPHIL # BLD: 0.1 K/UL (ref 0–0.4)
EOSINOPHIL NFR BLD: 1 % (ref 0–7)
ERYTHROCYTE [DISTWIDTH] IN BLOOD BY AUTOMATED COUNT: 12.8 % (ref 11.5–14.5)
HCT VFR BLD AUTO: 37.8 % (ref 35–47)
HGB BLD-MCNC: 12.7 G/DL (ref 11.5–16)
IMM GRANULOCYTES # BLD AUTO: 0 K/UL (ref 0–0.04)
IMM GRANULOCYTES NFR BLD AUTO: 1 % (ref 0–0.5)
LYMPHOCYTES # BLD: 2 K/UL (ref 0.8–3.5)
LYMPHOCYTES NFR BLD: 28 % (ref 12–49)
MCH RBC QN AUTO: 29.6 PG (ref 26–34)
MCHC RBC AUTO-ENTMCNC: 33.6 G/DL (ref 30–36.5)
MCV RBC AUTO: 88.1 FL (ref 80–99)
MONOCYTES # BLD: 0.7 K/UL (ref 0–1)
MONOCYTES NFR BLD: 10 % (ref 5–13)
NEUTS SEG # BLD: 4.3 K/UL (ref 1.8–8)
NEUTS SEG NFR BLD: 59 % (ref 32–75)
NRBC # BLD: 0 K/UL (ref 0–0.01)
NRBC BLD-RTO: 0 PER 100 WBC
PLATELET # BLD AUTO: 220 K/UL (ref 150–400)
PMV BLD AUTO: 13 FL (ref 8.9–12.9)
RBC # BLD AUTO: 4.29 M/UL (ref 3.8–5.2)
SAMPLES BEING HELD,HOLD: NORMAL
SOURCE, COVRS: NORMAL
WBC # BLD AUTO: 7.1 K/UL (ref 3.6–11)

## 2021-07-06 PROCEDURE — 85025 COMPLETE CBC W/AUTO DIFF WBC: CPT

## 2021-07-06 PROCEDURE — 75410000002 HC LABOR FEE PER 1 HR: Performed by: OBSTETRICS & GYNECOLOGY

## 2021-07-06 PROCEDURE — 87635 SARS-COV-2 COVID-19 AMP PRB: CPT

## 2021-07-06 PROCEDURE — 74011000258 HC RX REV CODE- 258: Performed by: STUDENT IN AN ORGANIZED HEALTH CARE EDUCATION/TRAINING PROGRAM

## 2021-07-06 PROCEDURE — 74011250637 HC RX REV CODE- 250/637: Performed by: STUDENT IN AN ORGANIZED HEALTH CARE EDUCATION/TRAINING PROGRAM

## 2021-07-06 PROCEDURE — 74011250636 HC RX REV CODE- 250/636: Performed by: STUDENT IN AN ORGANIZED HEALTH CARE EDUCATION/TRAINING PROGRAM

## 2021-07-06 PROCEDURE — 3E0DXGC INTRODUCTION OF OTHER THERAPEUTIC SUBSTANCE INTO MOUTH AND PHARYNX, EXTERNAL APPROACH: ICD-10-PCS | Performed by: OBSTETRICS & GYNECOLOGY

## 2021-07-06 PROCEDURE — 65270000029 HC RM PRIVATE

## 2021-07-06 RX ORDER — SODIUM CHLORIDE 0.9 % (FLUSH) 0.9 %
5-40 SYRINGE (ML) INJECTION AS NEEDED
Status: DISCONTINUED | OUTPATIENT
Start: 2021-07-06 | End: 2021-07-07

## 2021-07-06 RX ORDER — SODIUM CHLORIDE 0.9 % (FLUSH) 0.9 %
5-40 SYRINGE (ML) INJECTION EVERY 8 HOURS
Status: DISCONTINUED | OUTPATIENT
Start: 2021-07-06 | End: 2021-07-07

## 2021-07-06 RX ORDER — BETAMETHASONE SODIUM PHOSPHATE AND BETAMETHASONE ACETATE 3; 3 MG/ML; MG/ML
12 INJECTION, SUSPENSION INTRA-ARTICULAR; INTRALESIONAL; INTRAMUSCULAR; SOFT TISSUE EVERY 24 HOURS
Status: COMPLETED | OUTPATIENT
Start: 2021-07-06 | End: 2021-07-07

## 2021-07-06 RX ORDER — OXYTOCIN/RINGER'S LACTATE 30/500 ML
0-25 PLASTIC BAG, INJECTION (ML) INTRAVENOUS
Status: DISCONTINUED | OUTPATIENT
Start: 2021-07-07 | End: 2021-07-07

## 2021-07-06 RX ADMIN — SODIUM CHLORIDE 5 MILLION UNITS: 900 INJECTION INTRAVENOUS at 20:41

## 2021-07-06 RX ADMIN — BETAMETHASONE SODIUM PHOSPHATE AND BETAMETHASONE ACETATE 12 MG: 3; 3 INJECTION, SUSPENSION INTRA-ARTICULAR; INTRALESIONAL; INTRAMUSCULAR at 20:03

## 2021-07-06 RX ADMIN — MISOPROSTOL 25 MCG: 100 TABLET ORAL at 20:41

## 2021-07-06 NOTE — TELEPHONE ENCOUNTER
Emmett put patient through to the office to say she expelling a lot of fluid in the bathroom. OB PATIENT. Nurse took the call.

## 2021-07-06 NOTE — PROGRESS NOTES
1700 pt states she had a gush of fluid x1 this am.  Denies vaginal bleeding. Postive FM. nitrizine positive. 1 dr bah at bedside for assess and eval.  sve resulted 1/50. Plan of care discussed, decision to give betamethasone, antibiotics and oral cytotec for induction of labor.

## 2021-07-06 NOTE — H&P
[address] [facility] [address] [place] [address]   Office [phone], Fax [phone]      History & Physical    Name: Naina Paula MRN: [id number]  SSN: [id number]    YOB: 1985  Age: 39 y.o. Sex: female      Subjective:     Reason for Admission:  Pregnancy and  premature rupture of membranes    History of Present Illness: Ms. Jimmy Shepard is a 39 y.o.   female with an estimated gestational age of 29w2d with Estimated Date of Delivery: 21. Patient complains of moderate vaginal leaking of fluid for 1 days. Reports that she felt three to four contractions last night that lasted approximately 45 seconds prior to resolution. This morning when she went to the bathroom she reports leakage of large amount of clear fluid. She went to the [address] [place] [address] and was sent to L&D for rule out ROM. Reports mild abdominal pain rated 4/10 that is intermittent. She reports she does feel fetal movement. Pregnancy has been complicated by advanced maternal age, cervical incompetence and fetal growth restriction. Patient denies chest pain, fever, chills, headache , nausea and vomiting, pelvic pressure, shortness of breath and vaginal bleeding. OB History    Para Term  AB Living   3       2     SAB TAB Ectopic Molar Multiple Live Births                    # Outcome Date GA Lbr Jeremiah/2nd Weight Sex Delivery Anes PTL Lv   3 Current            2 AB 18 8w0d    SAB      1 AB 12 8w0d    SAB        Past Medical History:   Diagnosis Date    Abnormal Papanicolaou smear of cervix 2010    Normal Pap since    Diabetes ([facility] [address]     Gestational diet controlled    History of loop electrical excision procedure (LEEP) 2021    HPV (human papilloma virus) infection 2021     No past surgical history on file.   Social History     Occupational History    Not on file   Tobacco Use    Smoking status: Never Smoker    Smokeless tobacco: Never Used Vaping Use    Vaping Use: Never used   Substance and Sexual Activity    Alcohol use: Not Currently    Drug use: Never    Sexual activity: Not on file      No family history on file. Allergies   Allergen Reactions    Peanut Butter Flavor Anaphylaxis     Prior to Admission medications    Medication Sig Start Date End Date Taking? Authorizing Provider   Blood-Glucose Meter monitoring kit Please use glucometer to measure your blood sugar 4 times a day. Measure once in the morning before eating or drinking anything and exactly 2 hours after each meal. Keep a written log of the values. Bring the written log with you to every visit. 5/7/21  Yes Kia Martinez MD   lancets misc Please use glucometer to measure your blood sugar 4 times a day. Measure once in the morning before eating or drinking anything and exactly 2 hours after each meal. Keep a written log of the values. Bring the written log with you to every visit. 5/7/21  Yes Linh Lucia MD   glucose blood VI test strips (ASCENSIA AUTODISC VI, ONE TOUCH ULTRA TEST VI) strip Please use glucometer to measure your blood sugar 4 times a day. Measure once in the morning before eating or drinking anything and exactly 2 hours after each meal. Keep a written log of the values. Bring the written log with you to every visit. 5/7/21  Yes Kia Martinez MD   prenatal vit-iron fumarate-fa (PRENATAL PLUS with IRON) 28 mg iron- 800 mcg tab Take 1 Tab by mouth daily. Yes Provider, Historical        Review of Systems:  A comprehensive review of systems was negative except for that written in the History of Present Illness. Objective:     Vitals:    Vitals:    07/06/21 1711 07/06/21 1813   BP:  109/74   Pulse:  91   Weight: 68 kg (150 lb)    Height: 5' 2\" (1.575 m)       No data recorded. BP  Min: 109/74  Max: 109/74     Physical Exam:  Patient without distress.   Heart: Regular rate and rhythm, S1S2 present or without murmur or extra heart sounds  Lung: clear to auscultation throughout lung fields, no wheezes, no rales, no rhonchi and normal respiratory effort  Abdomen: normal bowel sounds, gravid abdomen, mildly tender to palpation  Fundus: firm     Extremities: Has mild edema of the lower extremities bilaterally   Membranes:   Premature Rupture of Membranes; Amniotic Fluid: clear fluid  Uterine Activity:  None  Fetal Heart Rate:  Reactive  Baseline: 150 per minute  Variability: moderate  Accelerations: yes  Decelerations: none  Uterine contractions: none       Lab/Data Review:  Recent Results (from the past 24 hour(s))   SAMPLES BEING HELD    Collection Time: 21  6:25 PM   Result Value Ref Range    SAMPLES BEING HELD 1LAV     COMMENT        Add-on orders for these samples will be processed based on acceptable specimen integrity and analyte stability, which may vary by analyte. Assessment and Plan:     Ms. Fran Geiger is a 39 y.o.   female with an estimated gestational age of 29w2d who is admitted with  premature rupture of membranes. Pregnancy complicated by advanced maternal age, GDM, h/o LEEP in , and IUGR. Patient is nitrazine positive. PPROM  Nitrazine positive. Betamethasone 12mg IM q24h x2 doses for fetal lung maturation. PCN 5 million units x1 and PCN 2.5 millions units q4h for antibiotic prophylaxis. Misoprostol 25 mcg q4h for IOL. SIUP  PNL: O+, RPR fale pos, Tpall neg x2, HIV/HepB/GC/CT neg, pap NILM, HPV neeg, Hgb fractionation wnl, Rubella/VZV immune. NIPT low risk, female. AMA  NIPT low risk. GDM  No prior h/o GDM. Not currently on medications. Controlled with diet and exercise. H/o LEEP  Performed in  at [facility]. Normal PAPs since that time. 3/25 scan with low normal (27.5cm) cervical length. IUGR   Second/third trimester ultrasound done 21 with EFW 9th% and AC <5th%. I have discussed the diagnosis with the patient and the intended plan as seen in the above orders.   All questions were answered concerning future plans. I have discussed medication side effects and warnings with the patient as well. Patient discussed with Dr. Chad Baird.      Cheri Ward MD  Family Medicine Resident

## 2021-07-06 NOTE — TELEPHONE ENCOUNTER
Patient is a  at 35w4d. Patient states she had a gush of fluid this morning. She says it is not like her normal discharge. Denies contractions or pain. She is still feeling baby move. Patient has hx of LEEP procedure. Will send to L&D for evaluation.

## 2021-07-07 ENCOUNTER — ANESTHESIA (OUTPATIENT)
Dept: LABOR AND DELIVERY | Age: 36
End: 2021-07-07

## 2021-07-07 ENCOUNTER — ANESTHESIA EVENT (OUTPATIENT)
Dept: LABOR AND DELIVERY | Age: 36
End: 2021-07-07

## 2021-07-07 LAB
GLUCOSE BLD STRIP.AUTO-MCNC: 150 MG/DL (ref 65–117)
GLUCOSE BLD STRIP.AUTO-MCNC: 99 MG/DL (ref 65–117)
SERVICE CMNT-IMP: ABNORMAL
SERVICE CMNT-IMP: NORMAL

## 2021-07-07 PROCEDURE — 65270000029 HC RM PRIVATE

## 2021-07-07 PROCEDURE — 76060000078 HC EPIDURAL ANESTHESIA: Performed by: ANESTHESIOLOGY

## 2021-07-07 PROCEDURE — 74011250636 HC RX REV CODE- 250/636: Performed by: OBSTETRICS & GYNECOLOGY

## 2021-07-07 PROCEDURE — 74011000258 HC RX REV CODE- 258: Performed by: OBSTETRICS & GYNECOLOGY

## 2021-07-07 PROCEDURE — 75410000002 HC LABOR FEE PER 1 HR: Performed by: OBSTETRICS & GYNECOLOGY

## 2021-07-07 PROCEDURE — 82962 GLUCOSE BLOOD TEST: CPT

## 2021-07-07 PROCEDURE — 74011250637 HC RX REV CODE- 250/637: Performed by: OBSTETRICS & GYNECOLOGY

## 2021-07-07 PROCEDURE — 74011000258 HC RX REV CODE- 258: Performed by: STUDENT IN AN ORGANIZED HEALTH CARE EDUCATION/TRAINING PROGRAM

## 2021-07-07 PROCEDURE — 74011250636 HC RX REV CODE- 250/636: Performed by: STUDENT IN AN ORGANIZED HEALTH CARE EDUCATION/TRAINING PROGRAM

## 2021-07-07 PROCEDURE — 3E033VJ INTRODUCTION OF OTHER HORMONE INTO PERIPHERAL VEIN, PERCUTANEOUS APPROACH: ICD-10-PCS | Performed by: STUDENT IN AN ORGANIZED HEALTH CARE EDUCATION/TRAINING PROGRAM

## 2021-07-07 RX ORDER — ZOLPIDEM TARTRATE 5 MG/1
5 TABLET ORAL
Status: DISCONTINUED | OUTPATIENT
Start: 2021-07-07 | End: 2021-07-07

## 2021-07-07 RX ORDER — FENTANYL/BUPIVACAINE/NS/PF 2-1250MCG
1-16 PREFILLED PUMP RESERVOIR EPIDURAL CONTINUOUS
Status: DISCONTINUED | OUTPATIENT
Start: 2021-07-07 | End: 2021-07-08 | Stop reason: SDUPTHER

## 2021-07-07 RX ORDER — NALBUPHINE HYDROCHLORIDE 10 MG/ML
10 INJECTION, SOLUTION INTRAMUSCULAR; INTRAVENOUS; SUBCUTANEOUS
Status: DISCONTINUED | OUTPATIENT
Start: 2021-07-07 | End: 2021-07-08

## 2021-07-07 RX ORDER — ACETAMINOPHEN 325 MG/1
650 TABLET ORAL
Status: DISCONTINUED | OUTPATIENT
Start: 2021-07-07 | End: 2021-07-08

## 2021-07-07 RX ORDER — SODIUM CHLORIDE, SODIUM LACTATE, POTASSIUM CHLORIDE, CALCIUM CHLORIDE 600; 310; 30; 20 MG/100ML; MG/100ML; MG/100ML; MG/100ML
125 INJECTION, SOLUTION INTRAVENOUS CONTINUOUS
Status: DISCONTINUED | OUTPATIENT
Start: 2021-07-07 | End: 2021-07-08

## 2021-07-07 RX ADMIN — BETAMETHASONE SODIUM PHOSPHATE AND BETAMETHASONE ACETATE 12 MG: 3; 3 INJECTION, SUSPENSION INTRA-ARTICULAR; INTRALESIONAL; INTRAMUSCULAR at 21:40

## 2021-07-07 RX ADMIN — SODIUM CHLORIDE 2.5 MILLION UNITS: 9 INJECTION, SOLUTION INTRAVENOUS at 21:40

## 2021-07-07 RX ADMIN — SODIUM CHLORIDE 2.5 MILLION UNITS: 9 INJECTION, SOLUTION INTRAVENOUS at 15:14

## 2021-07-07 RX ADMIN — PROMETHAZINE HYDROCHLORIDE 12.5 MG: 25 INJECTION INTRAMUSCULAR; INTRAVENOUS at 03:27

## 2021-07-07 RX ADMIN — OXYTOCIN 2 MILLI-UNITS/MIN: 10 INJECTION, SOLUTION INTRAMUSCULAR; INTRAVENOUS at 01:00

## 2021-07-07 RX ADMIN — NALBUPHINE HYDROCHLORIDE 10 MG: 10 INJECTION, SOLUTION INTRAMUSCULAR; INTRAVENOUS; SUBCUTANEOUS at 03:27

## 2021-07-07 RX ADMIN — SODIUM CHLORIDE, POTASSIUM CHLORIDE, SODIUM LACTATE AND CALCIUM CHLORIDE 125 ML/HR: 600; 310; 30; 20 INJECTION, SOLUTION INTRAVENOUS at 21:41

## 2021-07-07 RX ADMIN — ACETAMINOPHEN 650 MG: 325 TABLET ORAL at 20:30

## 2021-07-07 RX ADMIN — SODIUM CHLORIDE 2.5 MILLION UNITS: 9 INJECTION, SOLUTION INTRAVENOUS at 01:15

## 2021-07-07 RX ADMIN — SODIUM CHLORIDE, POTASSIUM CHLORIDE, SODIUM LACTATE AND CALCIUM CHLORIDE 125 ML/HR: 600; 310; 30; 20 INJECTION, SOLUTION INTRAVENOUS at 01:15

## 2021-07-07 RX ADMIN — SODIUM CHLORIDE 2.5 MILLION UNITS: 9 INJECTION, SOLUTION INTRAVENOUS at 05:20

## 2021-07-07 NOTE — PROGRESS NOTES
Labor Progress Note  Patient seen, fetal heart rate and contraction pattern evaluated, patient examined. Patient reports only feeling one contractions about 5 min prior to evaluation. She was having a contraction on the monitor in the room while we were there, but denied feeling it. Endorses good fetal movement. Reports small amount of bleeding after cervical check earlier in the evening. Patient Vitals for the past 1 hrs:   BP Temp Pulse Resp SpO2   21 2212 125/77 98.4 °F (36.9 °C) 86 16 92 %       Physical Exam:  Cervical Exam:  Cervical Exam  Dilation (cm): 1  Eff: 50 %  Vaginal exam done by? : dr bah  Membranes:   Premature Rupture of Membranes; Amniotic Fluid: clear fluid  Uterine Activity: Frequency: Every 4-5 minutes, Duration: 30 seconds and Intensity: mild  Fetal Heart Rate: Reactive  Baseline: 140 per minute  Variability: moderate  Accelerations: yes  Decelerations: none    Assessment/Plan:  Ms. Melani Batres is a 39 y.o.   female with an estimated gestational age of 29w2d who is admitted with  premature rupture of membranes. Pregnancy complicated by advanced maternal age, GDM, h/o LEEP in , and IUGR. Patient is nitrazine positive. PPROM  Nitrazine positive. Betamethasone 12mg IM q24h x2 doses for fetal lung maturation. PCN 5 million units x1 and PCN 2.5 millions units q4h for unknown GSB status. S/p Misoprostol 25 mcg x1. Start Pitocin at 0100. Will collect rapid COVID test.  SIUP  PNL: O+, RPR fale pos, Tpall neg x2, HIV/HepB/GC/CT neg, pap NILM, HPV neeg, Hgb fractionation wnl, Rubella/VZV immune. NIPT low risk, female. AMA  NIPT low risk. GDM  No prior h/o GDM. Not currently on medications. Controlled with diet and exercise. H/o LEEP  Performed in  at [facility]. Normal PAPs since that time. 3/25 scan with low normal (27.5cm) cervical length. IUGR   Second/third trimester ultrasound done 21 with EFW 9th% and AC <5th%.    Patient seen with  Ai Suarez, PGY-3  Mer Jimenez MD  Family Medicine Resident

## 2021-07-07 NOTE — PROGRESS NOTES
Labor Progress Note  Patient seen, fetal heart rate and contraction pattern evaluated, patient examined. Patient Vitals for the past 12 hrs:   Temp Pulse Resp BP   21 0715 97.9 °F (36.6 °C) 84 14 120/68   21 0611 98.1 °F (36.7 °C)      21 0437  80  113/74   21 0345 97.7 °F (36.5 °C)          Physical Exam:  Cervical Exam:  /-1 with bulging bag at 1330  Vaginal exam done by? : Dr. Mag Sadler  Membranes:  Intact  Uterine Activity: Frequency: Every 2-3 minutes  Fetal Heart Rate: Reactive  Baseline: 130 per minute  Variability: moderate  Accelerations: yes  Decelerations: none  Assessment/Plan:  Ms. Terrence Sterling is a 39 y.o.   female with an estimated gestational age of 27w7d who is admitted initially for concern for PPROM, now with concern she is in early labor. Pregnancy complicated by advanced maternal age, GDM, h/o LEEP in , and IUGR. Concern for PPROM - Nitrazine positive last night. Amnisure not done. Sterile speculum this morning reveals bag intact. MARCOS on bedside ultrasound 7. Betamethasone 12mg IM received at 2000 on . Next dose due tonight. Continue PCN 5 million units x1 and PCN 2.5 millions units q4h for unknown GBS status. S/p Misoprostol 25 mcg x1. Pitocin started at 0100, but turned off at 0915 d/t concern that she is not ruptured. SVE 2/100/-1 - not much change despite tayler every 3-4 minutes even off pitocin. Seen by MFM, report not back. Continue PCN for GBS unknown. SIUP: PNL - O+, RPR false pos, Tpall neg x2, HIV/HepB/GC/CT neg, pap NILM, HPV neeg, Hgb fractionation wnl, Rubella/VZV immune. NIPT low risk, female.   AMA : NIPT low risk.   GDM: Diet controlled. Will check blood sugar every hour in active labor. Fasting blood sugar this morning 99. H/o LEEP: Performed in  at [facility]. Normal PAPs since that time. 3/25 scan with low normal (27.5cm) cervical length.    IUGR: ultrasound done 21 with EFW 9th% and AC <5th%.   Patient discussed with Dr. Shelby Guzman.    Charlie Martínez DO  Family Medicine Resident

## 2021-07-07 NOTE — PROGRESS NOTES
Indication: AMA Multigravida 3rd Tri O09.523, Poor Fetal Growth 3rd Tri O36.593, PPROM 3rd Tri  O42.913.   ____________________________________________________________________________  History: Age: 39 years. Maternal age at [facility]: 39 years. : 3 Para: 0.   Current Pregnancy: Pre- pregnancy data: Weight 151 lbs. Height 5 ft 2 ins. BMI 27.7.  ____________________________________________________________________________  Dating:  LMP: 10/30/20 EDC: 21 GA by LMP: 35w5d  Best Overall Assessment: 21 EDC: 21 Assessed GA: 35w5d  The Best Overall Assessment is based on the LMP.  ____________________________________________________________________________  Anatomy Scan:  Spear gestation. Fetal heart activity: present. Fetal heart rate: 122 bpm.   Fetal presentation: cephalic. Placenta: anterior. Fetal Anatomy:  Visualized with normal appearance: gastrointestinal tract, kidneys, bladder. Heart: The 4-chamber view was obtained but outflow tracts could not be adequately visualized. Summary of Ultrasound Findings:  Transabdominal US. U/S machine: [employer] E8 Expert.     ____________________________________________________________________________  Fetal Wellbeing Assessment:  Amniotic fluid: normal. MARCOS: 12.4 cm. MVP: 4.5 cm. Q1: 2.3 cm. Q2: 4.5 cm. Q3: 3.9 cm. Q4: 1.7 cm. Non Stress Test: reactive. Fetal heart rate: 125 bpm. mod ren, +accel, no decel, irreg ctx. Biophysical Profile: Fetal body movements: normal (2), Fetal tone: abnormal (0), Fetal breathing movements: abnormal (0), Amniotic fluid volume: normal (2), Non-stress test: normal (2). Score 6 / 10.      Summary: Fetal status is equivocal.    ____________________________________________________________________________  Doppler:  Fetal Doppler:  Umbilical Artery: PS 30.6 cm/s    ED 21.11 cm/s    S/D ratio 2.71     RI 0.63      ____________________________________________________________________________  Report Summary:  Impression: Ms. Azalia Rutherford is currently admitted for concern for PPROM. She has had cytotec and pitocin. However, after repat pelvic exam this morning by Dr. Howell Adjutant, diagnosis of PPROM is called into question and she is most likely not ruptured. Today we see a cephalic SLIUP with normal fluid. MARCOS is 12 cm (normal, argues against PPROM). BPP is 4/8 (would be 6/10 with today's reactive NST on L&D, -2 for tone and -2 for breathing). Fetal surveillance is EQUIVOCAL. I counseled the patient on today's findings and recommendations below. Recommendations: If she does not go into labor or is not delivered for other reasons, BPP should be repeated within 24 hours. If still equivocal, delivery is recommended. Inpatient management with frequent if not continuous fetal monitoring is recommended, with a low threshold for delivery with any suspicion for fetal distress.

## 2021-07-07 NOTE — PROGRESS NOTES
7/7/2021   11:20 AM    CM met with FABBY to complete initial assessment and begin discharge planning. MOB verified and confirmed demographics. FABBY lives with MIGUEL Bingham ([phone]),  at the address on file. FABBY is not employed and plans to be home with infant. FOCLAIRE is employed and will be taking adequate time off. FABBY reports she has good family support. FABBY plans to breast feed baby. [facility] will provide follow up care for infant. FABBY has car seat, bassinet/crib, clothing, bottles and all necessary supplies for baby. FABBY does not have insurance and is interested in applying for Medicaid for herself and baby. [employer] will follow up with FABBY to assist with Medicaid application. FABBY is also interested in enrolling in [facility] services. CM provided MOB with [facility] clinic information. Care Management Interventions  PCP Verified by CM: Yes ([facility])  Mode of Transport at Discharge:  Other (see comment)  Transition of Care Consult (CM Consult): Discharge Planning  Current Support Network: Own Home, Family Lives Nearby  Confirm Follow Up Transport: Family  Discharge Location  Discharge Placement: Home with family assistance  Emmanuel Beauchamp

## 2021-07-07 NOTE — PROGRESS NOTES
2000 Shift assessment done. Plan of care discussed. PIV 20g placed in left wrist.  Call bell within reach. 2041 Pt given Cytotec 25mcg buccal.  Purpose of and benefits discussed with pt. Pt verbalized understanding.

## 2021-07-07 NOTE — PROGRESS NOTES
0700: Bedside and Verbal shift change report given to CHETNA Clark RN (oncoming nurse) by Gayle Cohn RN (offgoing nurse). Report included the following information SBAR, Kardex, Intake/Output, MAR, Recent Results, Med Rec Status and Quality Measures. 1515: IVF bolus started for epidural process per pt request.     0750: RENEA Sims made aware of pt wanting epidural.    0915: Pitocin stopped at this time per MD order. Dr. Wali Buchanan at bedside with US to try and determine if pt PPROM. MD noting MARCOS low, unable to determine if ruptured. Pt story somewhat inconsistent, so Dr. Wali Buchanan going to do spec exam to check for fluid. 2904: Spec exam completed, Dr. Wali Buchanan unable to see any pooling. SVE- pt 2cm, MD stating pt not ruptured, will call [facility] to determine next step in POC.     1104: This RN transporting pt up to [facility] at this time via wheelchair with FOB. EFM/TOCO removed. 1245: Pt returning from [facility] at this time. 1330: per Dr. Reuben Thomas, SVE- pt /-1 with bulging bag per Dr. Wali Buchanan. Pt may have clear liquid diet. 1430: Pt refusing pain medication at this time. Pt states that she does not want to take medication, that contractions are tolerable for now. 1500: per Dr. Reuben Thomas, Dr. Radu Michaud stating not to deliver at this time, will most likely have repeat BPP tomorrow, unless decided otherwise. 1810: This RN speaking with Dr. Jeison Daniel regarding diet. This RN stating that pt expressing that they have not been able to eat for about 24hrs. This RN making MD aware that pt still having discomfort with contractions, but can still sleep and talk on phone. Pt denies SROM or LOF. Per MD- pt can have regular diet to eat dinner. 1816: BG taken, pt stating she had iced tea prior. B which is elevated per pt. Dr. Cat Contreras made aware of BG, per MD- retake 1hr after pt eats dinner, may be due to lack of eating/stress. 1900: Bedside and Verbal shift change report given to EZE Childress RN (oncoming nurse) by Anthony Enamorado RN (offgoing nurse). Report included the following information SBAR, Kardex, Procedure Summary, Intake/Output, MAR, Recent Results, Med Rec Status and Quality Measures.

## 2021-07-07 NOTE — PROGRESS NOTES
1- Dr Britni Church at bedside reevaluating pt status. AVNI 2cm. MD felt BBOW. New orders for clear liquid diet at this time.

## 2021-07-07 NOTE — PROGRESS NOTES
2315: SBAR report given to ADRIANA Mukherjee RN by Christopher Gray RN. This RN assuming care of pt at this time. 2330: Covid swab collected, plan of care discussed with pt regarding pitocin and pain management. Pt verbalizes understanding. 8793-2850: Pt OOB ambulating to the restroom with steady gait. 0100: Pitocin started at this time. (see MAR)    8558-1483: Pt OOB ambulating to the restroom with steady gait. 0256: Pt requesting pain medication at this time. 0304: TORB for Nubain and phenergan per Dr Mirta Spain (see MAR)    1718: SVE per this RN 1cm/50. Pt remains afebrile    7707-0130: Pt OOB ambulating to the restroom with steady gait. 0700: Bedside and Verbal shift change report given to Alessandra Cheatham RN (oncoming nurse) by Jon Tao RN (offgoing nurse). Report included the following information SBAR, Kardex, Procedure Summary, Intake/Output, MAR, Accordion, Recent Results and Med Rec Status.

## 2021-07-07 NOTE — PROGRESS NOTES
Labor Progress Note  Patient seen, fetal heart rate and contraction pattern evaluated, patient examined. Patient Vitals for the past 12 hrs:   Temp Pulse Resp BP   21 0715 97.9 °F (36.6 °C) 84 14 120/68   21 0611 98.1 °F (36.7 °C)      21 0437  80  113/74   21 0345 97.7 °F (36.5 °C)          Physical Exam:  Cervical Exam:  /-1 with bulging bag at 1330  Vaginal exam done by? : Dr. Loaiza Solid  Membranes:  Intact  Uterine Activity: Frequency: Every 2-3 minutes  Fetal Heart Rate: Reactive  Baseline: 120 per minute  Variability: moderate  Accelerations: yes  Decelerations: none    Assessment/Plan:  Ms. Terrnece Sterling is a 39 y.o.   female with an estimated gestational age of 27w7d who is admitted initially for concern for PPROM, now with concern she is in early labor. Pregnancy complicated by advanced maternal age, GDM, h/o LEEP in , and IUGR. 1. Concern for early labor/initially concern for PPROM (ruled out):  S/p Misoprostol 25 mcg x1 and pitocin x ~8H (from 0100 to 0915 today) Nitrazine positive last night. Amnisure not done. Sterile speculum this morning reveals bag intact. MARCOS on bedside ultrasound 7.  MARCOS 12 on MFM US today, BPP 6/10. Will repeat tomorrow, if BPP still 6/10, plan for delivery. Will give 2nd dose of BMZ tonight at 2000. Continue PCN for GBS unknown. Last SVE /-1 - not much change despite tayler every 3-4 minutes even off pitocin. 2. SIUP: PNL - O+, RPR false pos, Tpall neg x2, HIV/HepB/GC/CT neg, pap NILM, HPV neeg, Hgb fractionation wnl, Rubella/VZV immune. NIPT low risk, female.   3. AMA : NIPT low risk.   4. GDM: Diet controlled. Will check blood sugar every hour in active labor. Fasting blood sugar this morning 99.   5. H/o LEEP: Performed in  at [facility]. Normal PAPs since that time. 3/25 scan with low normal (27.5cm) cervical length. 6. IUGR: ultrasound done 21 with EFW 9th% and AC <5th%.   Patient discussed with Dr. Josse Edwards Keya Luu, DO  Family Medicine Resident

## 2021-07-08 PROCEDURE — 77030021125

## 2021-07-08 PROCEDURE — 77030019905 HC CATH URETH INTMIT MDII -A

## 2021-07-08 PROCEDURE — 00HU33Z INSERTION OF INFUSION DEVICE INTO SPINAL CANAL, PERCUTANEOUS APPROACH: ICD-10-PCS | Performed by: ANESTHESIOLOGY

## 2021-07-08 PROCEDURE — 74011000250 HC RX REV CODE- 250: Performed by: ANESTHESIOLOGY

## 2021-07-08 PROCEDURE — 77030014125 HC TY EPDRL BBMI -B: Performed by: ANESTHESIOLOGY

## 2021-07-08 PROCEDURE — 77030005513 HC CATH URETH FOL11 MDII -B

## 2021-07-08 PROCEDURE — 65270000029 HC RM PRIVATE

## 2021-07-08 PROCEDURE — 10907ZC DRAINAGE OF AMNIOTIC FLUID, THERAPEUTIC FROM PRODUCTS OF CONCEPTION, VIA NATURAL OR ARTIFICIAL OPENING: ICD-10-PCS | Performed by: STUDENT IN AN ORGANIZED HEALTH CARE EDUCATION/TRAINING PROGRAM

## 2021-07-08 PROCEDURE — 75410000000 HC DELIVERY VAGINAL/SINGLE: Performed by: OBSTETRICS & GYNECOLOGY

## 2021-07-08 PROCEDURE — 59410 OBSTETRICAL CARE: CPT | Performed by: OBSTETRICS & GYNECOLOGY

## 2021-07-08 PROCEDURE — 2709999900 HC NON-CHARGEABLE SUPPLY

## 2021-07-08 PROCEDURE — 74011250636 HC RX REV CODE- 250/636: Performed by: OBSTETRICS & GYNECOLOGY

## 2021-07-08 PROCEDURE — 75410000003 HC RECOV DEL/VAG/CSECN EA 0.5 HR: Performed by: OBSTETRICS & GYNECOLOGY

## 2021-07-08 PROCEDURE — 74011250636 HC RX REV CODE- 250/636: Performed by: STUDENT IN AN ORGANIZED HEALTH CARE EDUCATION/TRAINING PROGRAM

## 2021-07-08 PROCEDURE — 3E0P7VZ INTRODUCTION OF HORMONE INTO FEMALE REPRODUCTIVE, VIA NATURAL OR ARTIFICIAL OPENING: ICD-10-PCS | Performed by: STUDENT IN AN ORGANIZED HEALTH CARE EDUCATION/TRAINING PROGRAM

## 2021-07-08 PROCEDURE — 75410000002 HC LABOR FEE PER 1 HR: Performed by: OBSTETRICS & GYNECOLOGY

## 2021-07-08 RX ORDER — DOCUSATE SODIUM 100 MG/1
100 CAPSULE, LIQUID FILLED ORAL
Status: DISCONTINUED | OUTPATIENT
Start: 2021-07-08 | End: 2021-07-10 | Stop reason: HOSPADM

## 2021-07-08 RX ORDER — SODIUM CHLORIDE 0.9 % (FLUSH) 0.9 %
5-40 SYRINGE (ML) INJECTION EVERY 8 HOURS
Status: DISCONTINUED | OUTPATIENT
Start: 2021-07-08 | End: 2021-07-08

## 2021-07-08 RX ORDER — IBUPROFEN 800 MG/1
800 TABLET ORAL EVERY 6 HOURS
Status: DISCONTINUED | OUTPATIENT
Start: 2021-07-08 | End: 2021-07-10 | Stop reason: HOSPADM

## 2021-07-08 RX ORDER — LIDOCAINE HYDROCHLORIDE AND EPINEPHRINE 15; 5 MG/ML; UG/ML
INJECTION, SOLUTION EPIDURAL
Status: SHIPPED | OUTPATIENT
Start: 2021-07-08 | End: 2021-07-08

## 2021-07-08 RX ORDER — IBUPROFEN 800 MG/1
800 TABLET ORAL
Status: DISCONTINUED | OUTPATIENT
Start: 2021-07-08 | End: 2021-07-08

## 2021-07-08 RX ORDER — HYDROCODONE BITARTRATE AND ACETAMINOPHEN 10; 325 MG/1; MG/1
1 TABLET ORAL
Status: DISCONTINUED | OUTPATIENT
Start: 2021-07-08 | End: 2021-07-10 | Stop reason: HOSPADM

## 2021-07-08 RX ORDER — ACETAMINOPHEN 325 MG/1
650 TABLET ORAL
Status: DISCONTINUED | OUTPATIENT
Start: 2021-07-08 | End: 2021-07-10 | Stop reason: HOSPADM

## 2021-07-08 RX ORDER — EPHEDRINE SULFATE/0.9% NACL/PF 50 MG/5 ML
SYRINGE (ML) INTRAVENOUS
Status: DISCONTINUED
Start: 2021-07-08 | End: 2021-07-08

## 2021-07-08 RX ORDER — SODIUM CHLORIDE 0.9 % (FLUSH) 0.9 %
5-40 SYRINGE (ML) INJECTION AS NEEDED
Status: DISCONTINUED | OUTPATIENT
Start: 2021-07-08 | End: 2021-07-08

## 2021-07-08 RX ORDER — NALOXONE HYDROCHLORIDE 0.4 MG/ML
0.4 INJECTION, SOLUTION INTRAMUSCULAR; INTRAVENOUS; SUBCUTANEOUS AS NEEDED
Status: DISCONTINUED | OUTPATIENT
Start: 2021-07-08 | End: 2021-07-10 | Stop reason: HOSPADM

## 2021-07-08 RX ORDER — ONDANSETRON 4 MG/1
4 TABLET, ORALLY DISINTEGRATING ORAL
Status: ACTIVE | OUTPATIENT
Start: 2021-07-08 | End: 2021-07-09

## 2021-07-08 RX ORDER — BUPIVACAINE HYDROCHLORIDE 2.5 MG/ML
INJECTION, SOLUTION EPIDURAL; INFILTRATION; INTRACAUDAL AS NEEDED
Status: DISCONTINUED | OUTPATIENT
Start: 2021-07-08 | End: 2021-07-09 | Stop reason: HOSPADM

## 2021-07-08 RX ORDER — OXYTOCIN/RINGER'S LACTATE 30/500 ML
10 PLASTIC BAG, INJECTION (ML) INTRAVENOUS AS NEEDED
Status: DISCONTINUED | OUTPATIENT
Start: 2021-07-08 | End: 2021-07-08

## 2021-07-08 RX ORDER — NALBUPHINE HYDROCHLORIDE 10 MG/ML
2.5 INJECTION, SOLUTION INTRAMUSCULAR; INTRAVENOUS; SUBCUTANEOUS
Status: ACTIVE | OUTPATIENT
Start: 2021-07-08 | End: 2021-07-08

## 2021-07-08 RX ORDER — OXYTOCIN/RINGER'S LACTATE 30/500 ML
10 PLASTIC BAG, INJECTION (ML) INTRAVENOUS AS NEEDED
Status: DISCONTINUED | OUTPATIENT
Start: 2021-07-08 | End: 2021-07-10 | Stop reason: HOSPADM

## 2021-07-08 RX ORDER — SODIUM CHLORIDE, SODIUM LACTATE, POTASSIUM CHLORIDE, CALCIUM CHLORIDE 600; 310; 30; 20 MG/100ML; MG/100ML; MG/100ML; MG/100ML
125 INJECTION, SOLUTION INTRAVENOUS CONTINUOUS
Status: DISCONTINUED | OUTPATIENT
Start: 2021-07-08 | End: 2021-07-08

## 2021-07-08 RX ORDER — FENTANYL/BUPIVACAINE/NS/PF 2-1250MCG
1-16 PREFILLED PUMP RESERVOIR EPIDURAL CONTINUOUS
Status: DISCONTINUED | OUTPATIENT
Start: 2021-07-08 | End: 2021-07-08

## 2021-07-08 RX ORDER — OXYTOCIN/RINGER'S LACTATE 30/500 ML
87.3 PLASTIC BAG, INJECTION (ML) INTRAVENOUS AS NEEDED
Status: COMPLETED | OUTPATIENT
Start: 2021-07-08 | End: 2021-07-08

## 2021-07-08 RX ORDER — OXYTOCIN/RINGER'S LACTATE 30/500 ML
87.3 PLASTIC BAG, INJECTION (ML) INTRAVENOUS AS NEEDED
Status: DISCONTINUED | OUTPATIENT
Start: 2021-07-08 | End: 2021-07-10 | Stop reason: HOSPADM

## 2021-07-08 RX ADMIN — OXYTOCIN 999 MILLI-UNITS/MIN: 10 INJECTION, SOLUTION INTRAMUSCULAR; INTRAVENOUS at 03:55

## 2021-07-08 RX ADMIN — SODIUM CHLORIDE, POTASSIUM CHLORIDE, SODIUM LACTATE AND CALCIUM CHLORIDE 999 ML/HR: 600; 310; 30; 20 INJECTION, SOLUTION INTRAVENOUS at 01:15

## 2021-07-08 RX ADMIN — BUPIVACAINE HYDROCHLORIDE 1 MG: 2.5 INJECTION, SOLUTION EPIDURAL; INFILTRATION; INTRACAUDAL; PERINEURAL at 01:06

## 2021-07-08 RX ADMIN — Medication 12 ML/HR: at 01:40

## 2021-07-08 RX ADMIN — LIDOCAINE HYDROCHLORIDE AND EPINEPHRINE 5 ML: 15; 5 INJECTION, SOLUTION EPIDURAL at 01:12

## 2021-07-08 NOTE — PROGRESS NOTES
6821- Bedside report in SBAR format from MARGARITA Bryson RN, care assumed at this time. Pt denies any needs at this time. Shift assessment complete. 4977 report to SABI Shultz

## 2021-07-08 NOTE — PROGRESS NOTES
[address] [facility] [address]   Office [phone], Fax [phone]                                Post-Partum Day Number 1 Progress Note    Stephie Petty 38 yo [id number] who is PPD1 s/p  at 35w6d. Patient doing well post-partum without significant complaint. Denies any pain. Has had minimal lochia. She is hungry for breakfast. Planning to breastfeed. She is ambulating and voiding without difficulty. She is passing flatus. Denies BM. Vitals:    Patient Vitals for the past 8 hrs:   BP Temp Pulse Resp SpO2   21 0735 110/80 97.4 °F (36.3 °C) 75 17 100 %     Temp (24hrs), Av.7 °F (36.5 °C), Min:97.4 °F (36.3 °C), Max:98.2 °F (36.8 °C)      Exam:  Patient without distress. Abdomen soft, fundus firm at level of umbilicus, nontender. Perineum with normal lochia noted. Lower extremities:  No edema. No palpable cords or tenderness. Lab/Data Review:  No results found for this or any previous visit (from the past 12 hour(s)). Assessment and Plan:    Stephie Petty is a 39 y.o. [id number] PPD1 s/p  at 35 weeks 6 days. Pregnancy was complicated by Q7GFU, advanced maternal age, IUGR (Ultrasound at 34w5d (21) EFW 9th% and AC <5th%), and h/o LEEP in . Labor was complicated by induced early labor due to concern for PPROM. Patient appears to be having uncomplicated post-partum course. Post partum  - routine PP care      - Motrin scheduled q6, Norco 10-325mg q4 prn  - lactation consult  - follow up with Dr. Ana Negro for 6 week postpartum visit (21), breast feeding, PP contraception undecided      A1GDM sugars well controlled. - no need to check sugars now that she's delivered  - GTT @ 6 wks PP visit, then DM screen q 3 years      Patient discussed with Dr. Ana Negro.     Charley Grover MD  Family Medicine Resident

## 2021-07-08 NOTE — ANESTHESIA PREPROCEDURE EVALUATION
Relevant Problems   No relevant active problems       Anesthetic History   No history of anesthetic complications            Review of Systems / Medical History  Patient summary reviewed and pertinent labs reviewed    Pulmonary  Within defined limits                 Neuro/Psych   Within defined limits           Cardiovascular                  Exercise tolerance: >4 METS     GI/Hepatic/Renal  Within defined limits              Endo/Other    Diabetes         Other Findings              Physical Exam    Airway  Mallampati: II  TM Distance: 4 - 6 cm  Neck ROM: normal range of motion   Mouth opening: Normal     Cardiovascular    Rhythm: regular  Rate: normal         Dental  No notable dental hx       Pulmonary  Breath sounds clear to auscultation               Abdominal  GI exam deferred       Other Findings            Anesthetic Plan    ASA: 2  Anesthesia type: CSE            Anesthetic plan and risks discussed with: Patient

## 2021-07-08 NOTE — ANESTHESIA PROCEDURE NOTES
CSE Block    Start time: 7/8/2021 1:00 AM  End time: 7/8/2021 1:14 AM  Performed by: Scott Dunn MD  Authorized by: Scott Dunn MD     Pre-Procedure  Indications: procedure for pain    preanesthetic checklist: patient identified, risks and benefits discussed, anesthesia consent, site marked, patient being monitored and timeout performed    Timeout Time: 01:01 EDT        Procedure:   Patient Position:  Seated  Prep Region:  Lumbar  Prep: chlorhexidine    Location:  L2-3    Epidural Needle:   Needle Type:  Kendra Vo  Needle Gauge:  18 G  Injection Technique:  Loss of resistance using saline  Attempts:  1    Spinal Needle:   Needle Type:   Amanda  Needle Gauge:  27 G    Catheter:   Catheter Type:  Standard  Catheter Size:  20 G  Catheter at Skin Depth (cm):  10  Depth in Epidural Space (cm):  5  Events: CSF confirmed    Test Dose:  Negative    Assessment:   Catheter Secured:  Tegaderm and tape  Insertion:  Uncomplicated  Patient tolerance:  Patient tolerated the procedure well with no immediate complications

## 2021-07-08 NOTE — PROGRESS NOTES
0015: RN at bedside for primary RN. Pt c/o increased pain and pressure and urge to have a bowel movement. 0025: SVE per RENEA Reyes RN 8 cm.

## 2021-07-08 NOTE — L&D DELIVERY NOTE
Delivery Summary    Patient: Bennie Heimlich MRN: [id number]  SSN: [id number]    YOB: 1985  Age: 39 y.o. Sex: female       Patient progressed well to C/C/+1 with a bulging bag. AROM just prior to delivery with clear fluid. Medial lateral episiotomy cut due to fetal bradycardia. Patient pushed for approximately 20 minutes.  of live female infant. Head delivered slightly STEFANY. Rt anterior shoulder delivered w/ single maternal effort w/ posterior shoulder and body following easily. Infant placed on maternal abdomen. Delayed cord clamping x 1 min. Cord clamped x 2 and cut by FOB  (Ex. FOB). Pitocin infusion initiated. Placenta delivered spontaneously intact w/ 3 v cord. Perineum inspected. Medial lateral episiotomy repaired with 2-0 Monocryl by Dr. Magdalena Martin (PGY3) assisted by Dr. Amado Moore (attending). Fundus firm at U. Mother and baby bonding in LDR. Information for the patient's :  Joyce Norris, Female Veronica [[id number]]       Labor Events:    Labor: Yes    Steroids: Partial Course   Cervical Ripening Date/Time: 2021 8:41 PM   Cervical Ripening Type: Misoprostol   Antibiotics During Labor: Yes   Rupture Identifier:      Rupture Date/Time:       Rupture Type: SROM   Amniotic Fluid Volume: Moderate    Amniotic Fluid Description: Clear    Amniotic Fluid Odor:      Induction: Prostaglandins; Oxytocin       Induction Date/Time: 2021 8:41 PM    Indications for Induction: IUGR;Premature ROM; Prolonged ROM    Augmentation: Oxytocin   Augmentation Date/Time: 11:00 AM   Indications for Augmentation: Prolonged ROM   Labor complications:          Additional complications:        Delivery Events:  Indications For Episiotomy: Other (See Note)   Episiotomy: Midline   Perineal Laceration(s):     Repaired:     Periurethral Laceration Location:      Repaired:     Labial Laceration Location:     Repaired:     Sulcal Laceration Location:     Repaired:     Vaginal Laceration Location:     Repaired:     Cervical Laceration Location:     Repaired:     Repair Suture: Monocryl 2-0   Number of Repair Packets:     Estimated Blood Loss (ml):  ml   Quantitative Blood Loss (ml)                Delivery Date: 2021    Delivery Time: 3:54 AM  Delivery Type: Vaginal, Spontaneous  Sex:  Female    Gestational Age: 26w5d   Delivery Clinician:  Wilda Unger  Living Status: Living   Delivery Location: L&D            APGARS  One minute Five minutes Ten minutes   Skin color: 0   1        Heart rate: 2   2        Grimace: 2   2        Muscle tone: 2   2        Breathin   2        Totals: 8   9            Presentation: Vertex    Position: Left Occiput Anterior  Resuscitation Method:  Suctioning-bulb; Tactile Stimulation;Suctioning-deep     Meconium Stained: None      Cord Information: 3 Vessels  Complications: None  Cord around:    Delayed cord clamping? Yes  Cord clamped date/time:2021  3:55 AM  Disposition of Cord Blood: Lab    Blood Gases Sent?: No    Placenta:  Date/Time:    Removal:        Appearance:        Measurements:  Birth Weight:        Birth Length:        Head Circumference:        Chest Circumference:       Abdominal Girth: Other Providers:   CHARO DRISCOLL;YKLE GUNN;NOAM OCONNOR;KAYLA PEDRAZA;STEPHY BLANKENSHIP;;NAE LEAVITT, Obstetrician;Primary Nurse;Primary [place] Nurse;Nicu Nurse;Resident;Nurse Practitioner;Scrub Tech           Group B Strep: No results found for: [employer]  Information for the patient's :  Jerson Man, Female Veronica [[id number]]   No results found for: ABORH, PCTABR, PCTDIG, BILI, ABORHEXT, ABORH     No results for input(s): PCO2CB, PO2CB, HCO3I, SO2I, IBD, PTEMPI, SPECTI, PHICB, ISITE, IDEV, IALLEN in the last 72 hours.

## 2021-07-08 NOTE — LACTATION NOTE
This note was copied from a baby's chart. Reviewed breastfeeding basics:  Supply and demand,  stomach size, early  Feeding cues, skin to skin, positioning and baby led latch-on,  latched with signs of good, deep latch vs shallow, feeding frequency and duration, and log sheet for tracking infant feedings and output. Breastfeeding Booklet and Warm line information given. Discussed typical  weight loss and the importance of infant weight checks with pediatrician 1-2 post discharge. Hand Expression Education:  Mom taught how to manually hand express her colostrum. Emphasized the importance of providing infant with valuable colostrum as infant rests skin to skin at breast.  Aware to avoid extended periods of non-feeding. Aware to offer 10-20+ drops of colostrum every 2-3 hours until infant is latching and nursing effectively. Taught the rationale behind this low tech but highly effective evidence based practice. 14 drops collected and finger fed, and collected in curved tip syringe and fed to baby. Discussed with mother her plan for feeding. Reviewed the benefits of exclusive breast milk feeding during the hospital stay. Informed her of the risks of using formula to supplement in the first few days of life as well as the benefits of successful breast milk feeding; referred her to the Breastfeeding booklet about this information. She acknowledges understanding of information reviewed and states that it is her plan to just breast feed, but since baby is nursing well, gave some formula her infant. Will support her choice and offer additional information as needed. Pt will successfully establish breastfeeding by feeding in response to early feeding cues   or wake every 3h, will obtain deep latch, and will keep log of feedings/output. Taught to BF at hunger cues and or q 2-3 hrs and to offer 10-20 drops of hand expressed colostrum at any non-feeds. Baby born at 35.6.  sleepy.   Not latching. Attempted several times to get baby to latch. Hand expressed 14 drops and given finger and syringe . Attempted to get baby to suckle with 20 mm nipple shield. Latched and a few suckles noted.      Breast Assessment  Left Breast: Small   Left Nipple: Everted, Intact  Right Breast: Small   Right Nipple: Everted, Intact  Breast- Feeding Assessment  Attends Breast-Feeding Classes: No  Breast-Feeding Experience: Yes  Breast Trauma/Surgery: No  Type/Quality: Poor (with 20 mm nipple shield)  Lactation Consultant Visits  Breast-Feedings: Poor  Mother/Infant Observation  Mother Observation: Alignment, Breast comfortable, Close hold, Holds breast  Infant Observation: Breast tissue moves, Latches nipple and aereolae, Lips flanged, lower, Lips flanged, upper, Opens mouth (with 20 mm nipple shield)  LATCH Documentation  Latch: Repeated attempts, hold nipple in mouth, stimulate to suck  Audible Swallowing: A few with stimulation  Type of Nipple: Everted (after stimulation)  Comfort (Breast/Nipple): Soft/non-tender  Hold (Positioning): Full assist, teach one side, mother does other, staff holds  [facility] Score: 7

## 2021-07-08 NOTE — LACTATION NOTE
This note was copied from a baby's chart. Mom states hand expressed drops again at 1 pm and finger fed them to the baby. Baby sleeping now. .    Guidelines for pumping, milk collection and storage, proper cleaning of pump parts all reviewed. Differences between hospital grade rental pumps vs store bought double electric/hand pumps discussed. Set up pumping with double electric set up. Assisted with pump session. Discussed finger feeding any drops to baby from the pumping.

## 2021-07-08 NOTE — PROGRESS NOTES
Billing Note. This is FP patient, delivered on 7/8/2021 by resident under my supervision.  Eloisa Joyner

## 2021-07-08 NOTE — PROGRESS NOTES
Labor Progress Note  Patient seen, fetal heart rate and contraction pattern evaluated, patient examined. Nurse stated patient was complaining of contractions and feeling like she has to take a bowel movement. Physical Exam:  Cervical Exam:  Cervical Exam  Dilation (cm): 8  Eff: 100 %  Station: -1  Vaginal exam done by? : Lavinia Walker Rn and RENEA Reyes RN  Membrane Status: SROM (this morning )  Membranes:  Intact  Uterine Activity: Frequency: Every 2-3 minutes  Fetal Heart Rate: Reactive  Baseline: 140 per minute  Variability: moderate  Accelerations: yes  Decelerations: none    Assessment/Plan:  Ms. Terrence Sterling is a 39 y.o.   female with an estimated gestational age of 27w7d who is admitted initially for concern for PPROM, now with concern she is in early labor. Pregnancy complicated by advanced maternal age, GDM, h/o LEEP in , and IUGR. 1. Concern for early labor/initially concern for PPROM (ruled out):  S/p Misoprostol 25 mcg x1 and pitocin x ~8H (from 0100 to 0915 today) Nitrazine positive last night. Amnisure not done. Sterile speculum this morning reveals bag intact. MARCOS on bedside ultrasound 7.  MARCOS 12 on MFM US today, BPP 6/10. Will repeat tomorrow, if BPP still 6/10, plan for delivery. s/p 2nd dose of BMZ. Continue PCN for GBS unknown. Last SVE 8/100/-1. Will continue to monitor, anticipate   2. SIUP: PNL - O+, RPR false pos, Tpall neg x2, HIV/HepB/GC/CT neg, pap NILM, HPV neeg, Hgb fractionation wnl, Rubella/VZV immune. NIPT low risk, female.   3. AMA : NIPT low risk.   4. GDM: Diet controlled. Will check blood sugar every hour in active labor. Fasting blood sugar this morning 99.   5. H/o LEEP: Performed in  at [address] [place] [address]. Normal PAPs since that time. 3/25 scan with low normal (27.5cm) cervical length. 6. IUGR: ultrasound done 21 with EFW 9th% and AC <5th%.   Patient seen with Dr. Denise Matson, PGY-3 and discussed with Dr. Armando Bennett.   Fang Banks MD  Family Medicine Resident

## 2021-07-08 NOTE — PROGRESS NOTES
1900: Bedside and Verbal shift change report given to ADRIANA Lay RN (oncoming nurse) by Sheldon Diaz RN (offgoing nurse). Report included the following information SBAR, Kardex, Intake/Output, MAR, Accordion, Recent Results and Med Rec Status. 1940: this RN at the bedside rounding on pt. Pt sitting in bed after eating dinner. Pt report feeling ctx intermittently. Pt states she wants pain relief but is hesitant taking pain medication. This RN discussing starting out with acetaminophen and if pt does not feel relief then pt can have nubain. Pt verbalizes understanding. 1945: Assessment WDL and vss.    2030: Pt given pain medication by Scarlet Agrawal RN  (see MAR)    2140: 2nd dose of betamethasone given at this time. 0025: SVE by Ramiro Bettencourt RN (see note)    2326: Darin Swann MD at the bedside for epidural placement. 4517: Time out  0105: epidural placed  0106: test dose    0110: Pt repositioned back in bed with EFM and toco readjusted. Pt encouraged to rest.  Pt reports she cannot feel her ctx. 0150: Elias placed without complication. Pt turned on her left side. 0330: Pt reporting the urge to push. Saul Romo MD at the bedside. Pt complete with BBOW. 0: Dr Howell Adjutant at the bedside. Elias removed at this time. 500 mL clear yellow urine noted in bag.    0342: Patient actively pushing. RN remains in continuous attendance at the bedside. Assessment & evaluation of fetal heart rate ongoing via continuous EFM. 0350: Midline episiotomy cut by Saul Romo MD.    3458: RN remained at bedside throughout pushing. EFM continuously assessed. Vaginal delivery of viable female infant. 0400: Delivery of placenta. MD repairing epis laceration. Fundus firm at umbilicus. 0410: MD finished repairing. Soledad care provided, pt skin to skin with baby. 0500: Fundus deviated to the right. Will straight cath  0505: straight cath inserted  0510:straight cath removed. 1500 mL clear yellow urine noted.   Fundus firm -2.      0700: Bedside and Verbal shift change report given to Maureen Long RN (oncoming nurse) by Ana Perry RN (offgoing nurse). Report included the following information SBAR, Kardex, Procedure Summary, Intake/Output, MAR, Accordion, Recent Results and Med Rec Status.

## 2021-07-09 PROCEDURE — 65270000029 HC RM PRIVATE

## 2021-07-09 NOTE — LACTATION NOTE
This note was copied from a baby's chart. Discussed anticipated breast feeding discharge information    Breast Feeding Discharge Information discussed:    Chart shows numerous feedings, void, stool WNL. Discussed Importance of monitoring outputs and feedings on first week of  Breastfeeding. Discussed ways to tell if baby getting enough, ie  Voids and stools, by day 7, baby should have at least  4-6 wet diapers a day, change in color of stool to a seedy yellow, and return to birth wt within 2 weeks with a steady increase after that. .  Follow up with pediatrician visit for weight check in 1-2 days reviewed. Discussed Breast feeding support groups and encouraged to call Warm line number, [phone]  for any breast feeding questions or problems that arise. Please leave a message and tell us what is going on. We will return your call within 24 hours. Please repeat your phone number. Feedings  Encouraged mom to attempt feeding with baby led feeding cues. Just as sucking on fingers, rooting, mouthing. Looking for 8-12 feedings in 24 hours. Don't limit baby at breast, allow baby to come off breast on it's own. Baby may want to feed  often and may increase number of feedings on second day of life. Skin to skin encouraged. In 4-6 weeks, baby may go though a growth spurt and increase feedings for several days to increase your milk supply. If baby doesn't nurse,  Mom should Pump or hand express drops, 12-18 drops, and give infant any expressed milk. If not pumping any milk, mom should contact pediatrician for possible need for supplementation. MOM's DIET    Discussed eating a healthy diet. Instructed mother to eat a variety of foods in order to get a well balanced diet. She should consume an extra 300-500 calories per day (more than her non-pregnant requirement.) These extra calories will help provide energy needed for optimal breast milk production.  Mother also encouraged to \"drink to thirst\" and it is recommended that she drink fluids such as water and fruit/vegetable juice. Nutritious snacks should be available so that she can eat throughout the day to help satisfy her hunger and maintain a good milk supply. Continue taking your Prenatal vitamins as long as you breast feed. Engorgement Care Guidelines:  Anticipatory guidance shared. If breast become engorged, to help decrease engorgement. Frequent breastfeeding encouraged, cool packs around breast after nursing may help. May take motrin or Ibuprofen as ordered by your Doctor.       Call your doctor, midwife and/or lactation consultant if:   Cami Luis is having no wet or dirty diapers    Baby has dark colored urine after day 3  (should be pale yellow to clear)    Baby has dark colored stools after day 4  (should be mustard yellow, with no meconium)    Baby has fewer wet/soiled diapers or nurses less   frequently than the goals listed here    Mom has symptoms of mastitis   (sore breast with fever, chills, flu-like aching)

## 2021-07-09 NOTE — DISCHARGE SUMMARY
Obstetrical Discharge Summary     Name: Andre Gonzalez MRN: [id number]  SSN: [id number]    YOB: 1985  Age: 39 y.o. Sex: female      Admit Date: 2021    Discharge Date: 7/10/2021     Admitting Physician: Lashawn Kellogg MD     Attending Physician:  Rodrigo Bennett DO     Admission Diagnoses:  premature rupture of membranes [O42.919]  Pregnancy [Z34.90]    Discharge Diagnoses:   Information for the patient's :  Denita Nash, Female Brady Dupont [[id number]]   Delivery of a 2.16 kg female infant via Vaginal, Spontaneous on 2021 at 3:54 AM  by Mackenzie Moody. Apgars were 8  and 9 . Additional Diagnoses:   Hospital Problems  Date Reviewed: 2021        Codes Class Noted POA    Pregnancy ICD-10-CM: Z34.90  ICD-9-CM: V22.2  2021 Unknown         premature rupture of membranes ICD-10-CM: O42.919  ICD-9-CM: 658.10  2021 Unknown             Lab Results   Component Value Date/Time    Rubella, External Immune 2021 12:00 AM       Immunization(s):   Immunization History   Administered Date(s) Administered    Influenza Vaccine Cavis microcaps) PF (>6 Mo Flulaval, Fluarix, and >3 [place] [address]) 2021    Tdap 2021        Hospital Course:   Patient is a 39 y.o. [id number] s/p  at 35 weeks 6 days. Pregnancy was complicated by Q3PVI, advanced maternal age, IUGR (ultrasound at 34w5d (21) EFW 9th% and AC <5th%), and h/o LEEP in . Labor was complicated by induced labor due to concern for PPROM. At [facility], patient was nitrazine positive. Patient was given Misoprostol 25mcg x1 and Betamethasone 12mg x2 12 hours apart. Pitocin was initiated at 0100. Upon reevaluation at 0900, sterile speculum exam revealed bag intact. Pitocin was discontinued. MARCOS on bedside ultrasound at that time was 7. MFM was consulted and performed US revealing MARCOS 12 and BPP 6/10. Patient continued to have contractions and progress to complete status.  Patient delivered TLFI by  over medial lateral episiotomy for fetal bradycardia. Normal hospital course following the delivery. On day of discharge patient reported minimal lochia, well controlled pain, and no other complaints. Discharged with pain regimen and bowel regimen. Advised to continue prenatal vitamins and iron supplement. Follow up with Dr. Oscar Cruz in six weeks.  Depression Scale: 2         Condition at Discharge:  Stable  Disposition: Discharge to Home    Physical exam:  Visit Vitals  /75 (BP 1 Location: Left upper arm, BP Patient Position: At rest)   Pulse 74   Temp 97.7 °F (36.5 °C)   Resp 14   Ht 5' 2\" (1.575 m)   Wt 68 kg (150 lb)   LMP 10/30/2020 (Exact Date)   SpO2 98%   Breastfeeding Unknown   BMI 27.44 kg/m²       Exam:  Patient without distress. CTAB, no w/r/r/c               RRR, + S1 and S2, no m/r/g               Abdomen soft, fundus firm at level of umbilicus, non tender               Perineum with normal lochia noted. Lower extremities are negative for swelling, cords or tenderness. Patient Instructions:   Current Discharge Medication List      START taking these medications    Details   docusate sodium (COLACE) 100 mg capsule Take 1 Capsule by mouth daily as needed for Constipation for up to 90 days. Qty: 20 Capsule, Refills: 0  Start date: 7/10/2021, End date: 10/8/2021      ibuprofen (MOTRIN) 800 mg tablet Take 1 Tablet by mouth every twelve (12) hours. Qty: 30 Tablet, Refills: 0  Start date: 7/10/2021         CONTINUE these medications which have NOT CHANGED    Details   Blood-Glucose Meter monitoring kit Please use glucometer to measure your blood sugar 4 times a day. Measure once in the morning before eating or drinking anything and exactly 2 hours after each meal. Keep a written log of the values. Bring the written log with you to every visit.   Qty: 1 Kit, Refills: 0    Associated Diagnoses: Diet controlled gestational diabetes mellitus (GDM) in second trimester      lancets misc Please use glucometer to measure your blood sugar 4 times a day. Measure once in the morning before eating or drinking anything and exactly 2 hours after each meal. Keep a written log of the values. Bring the written log with you to every visit. Qty: 1 Each, Refills: 11    Associated Diagnoses: Diet controlled gestational diabetes mellitus (GDM) in second trimester      glucose blood VI test strips (ASCENSIA AUTODISC VI, ONE TOUCH ULTRA TEST VI) strip Please use glucometer to measure your blood sugar 4 times a day. Measure once in the morning before eating or drinking anything and exactly 2 hours after each meal. Keep a written log of the values. Bring the written log with you to every visit. Qty: 43 Strip, Refills: 11    Associated Diagnoses: Diet controlled gestational diabetes mellitus (GDM) in second trimester      prenatal vit-iron fumarate-fa (PRENATAL PLUS with IRON) 28 mg iron- 800 mcg tab Take 1 Tab by mouth daily. Reference my discharge instructions. Follow-up Information     Follow up With Specialties Details Why Benjiman Bence, MD Obstetrics & Gynecology, Gynecology, Obstetrics On 8/19/2021 Please follow up for your six week postpartum check. Your appointment time is 1:40pm.  [address] [place] [address]  [phone]      Aidee Agarwal MD Family Medicine On 7/12/2021 lisa de recien nacido.  La hora de ramirez lisa es a las 2:55pm.  [address] [place] [id number]  [phone]              Signed By:  Marielle Moreira MD    Family Medicine Resident

## 2021-07-09 NOTE — LACTATION NOTE
This note was copied from a baby's chart. Mom states baby sleepy and hadn't tried to put baby to breast.  Discussed using small nipple shield to get baby to breast.  Shown how to use. Mom giving a bottle of pumped milk. Pt will successfully establish breastfeeding by feeding in response to early feeding cues   or wake every 3h, will obtain deep latch, and will keep log of feedings/output. Taught to BF at hunger cues and or q 2-3 hrs and to offer 10-20 drops of hand expressed colostrum at any non-feeds.       Breast Assessment  Left Breast: Small   Left Nipple: Everted, Intact  Right Breast: Small   Right Nipple: Everted, Intact  Breast- Feeding Assessment  Attends Breast-Feeding Classes: No  Breast-Feeding Experience: Yes  Breast Trauma/Surgery: No  Type/Quality: Poor (with 20 mm nipple shield)  Lactation Consultant Visits  Breast-Feedings: Poor  Mother/Infant Observation  Mother Observation: Alignment, Breast comfortable, Close hold, Holds breast  Infant Observation: Breast tissue moves, Latches nipple and aereolae, Lips flanged, lower, Lips flanged, upper, Opens mouth (with 20 mm nipple shield)  LATCH Documentation  Latch: Repeated attempts, hold nipple in mouth, stimulate to suck  Audible Swallowing: A few with stimulation  Type of Nipple: Everted (after stimulation)  Comfort (Breast/Nipple): Soft/non-tender  Hold (Positioning): Full assist, teach one side, mother does other, staff holds  [facility] Score: 7

## 2021-07-10 VITALS
HEART RATE: 76 BPM | RESPIRATION RATE: 16 BRPM | TEMPERATURE: 98.2 F | WEIGHT: 150 LBS | DIASTOLIC BLOOD PRESSURE: 74 MMHG | HEIGHT: 62 IN | OXYGEN SATURATION: 98 % | SYSTOLIC BLOOD PRESSURE: 109 MMHG | BODY MASS INDEX: 27.6 KG/M2

## 2021-07-10 PROCEDURE — 74011250637 HC RX REV CODE- 250/637: Performed by: STUDENT IN AN ORGANIZED HEALTH CARE EDUCATION/TRAINING PROGRAM

## 2021-07-10 RX ORDER — IBUPROFEN 800 MG/1
800 TABLET ORAL EVERY 12 HOURS
Qty: 30 TABLET | Refills: 0 | Status: SHIPPED | OUTPATIENT
Start: 2021-07-10

## 2021-07-10 RX ORDER — DOCUSATE SODIUM 100 MG/1
100 CAPSULE, LIQUID FILLED ORAL
Qty: 20 CAPSULE | Refills: 0 | Status: SHIPPED | OUTPATIENT
Start: 2021-07-10 | End: 2021-10-08

## 2021-07-10 RX ADMIN — IBUPROFEN 800 MG: 800 TABLET, FILM COATED ORAL at 13:25

## 2021-07-10 NOTE — ROUTINE PROCESS
Pt off unit in stable condition in wheelchair with volunteers for discharge home per Dr. Beena Sarah and attending . Pt aware of follow up in  6 weeks. Rx sent electronically, patient aware. Denies any HA, N/V, pain & dizziness at this time. Infant in carseat and d/c home with mother.

## 2021-07-10 NOTE — DISCHARGE INSTRUCTIONS
Patient Discharge Instructions    Alfred Snellen / [id number] : 1985    Admitted 2021 Discharged: 7/10/2021       Please bring this form with you to show your care provider at your follow-up appointment. Primary care provider:  Konstantin Palomares MD    Discharging provider:  Samina Conti MD  - Family Medicine Resident  Sebastian Brown MD  -  OBGYN attending          ACUTE DIAGNOSES:   premature rupture of membranes [O42.919]  Pregnancy [Z34.90]      FOLLOW-UP CARE RECOMMENDATIONS:    Follow-up Information     Follow up With Specialties Details Why Harjit James MD Obstetrics & Gynecology, Gynecology, Obstetrics On 2021 Please follow up for your six week postpartum check. Your appointment time is 1:40pm.  [address] [place] [address]  [phone]      Claire Agarwal MD Family Medicine On 2021 lisa de recien nacido. La hora de ramirez lisa es a las 2:55pm.  [address]  [phone]            Continuing Therapy:  - Please continue Motrin 800 mg, 1 tablet every 12 hours as needed for pain  - Please continue Colace 100 mg for constipation, take one tablet BID until having regular bowel movements  - Please start Ferrous Sulfate 325 mg for anemia, Take 1 tablet one time daily with Orange juice  - Please continue your prenatal vitamins    Specific symptoms to watch for: chest pain, shortness of breath, fever, chills, nausea, vomiting, diarrhea, change in mentation, falling, weakness, bleeding. DIET/what to eat:  Regular     ACTIVITY:   Activity Instructions    Do not lift anything heavier than your baby for 6 weeks. Nothing in the vagina for 6 weeks. After having a /vaginal delivery you will still need to wait about six weeks before having sex. You will have your six-week postpartum check-up at this time. You are ok to drive as long as you are not taking Percocet or other narcotic pain medication (Motrin is ok). Wound care:  fill the capo bottle with warm water and squeeze it: a jet of water will shoot out the nozzle to help you cleanse and wash your perineal area. I understand that if any problems occur once I am at home I am to contact my physician. These instructions were explained to me and I had the opportunity to ask questions. I understand and acknowledge receipt of the instructions indicated above.                                                                                                                                                Physician's or R.N.'s Signature                                                                  Date/Time                                                                                                                                              Patient or Representative Signature                                                          Date/Time

## 2021-07-10 NOTE — LACTATION NOTE
This note was copied from a baby's chart. Discussed maximizing success at exclusive pumping to protect supply while still attempting to nurse baby at the breast. Hand expression reviewed and encouraged. Skin to skin promoted regardless of feeding method and paced bottle feeding discussed a way to help facilitate from breast to bottle. Chart shows numerous feedings, void, stool WNL. Discussed importance of monitoring outputs and feedings on first week of life. Discussed ways to tell if baby is  getting enough breast milk, ie  voids and stools, change in color of stool, and return to birth wt within 2 weeks. Follow up with pediatrician visit for weight check in 1-2 days (per AAP guidelines.)  Encouraged to call Warm Line  [phone]  for any questions/problems that arise. Mother also given breastfeeding support group dates and times for any future needs. Hand Expression Education:  Mom taught how to manually hand express her colostrum. Emphasized the importance of providing infant with valuable colostrum as infant rests skin to skin at breast.  Aware to avoid extended periods of non-feeding. Aware to offer 10-20+ drops of colostrum every 2-3 hours until infant is latching and nursing effectively. Taught the rationale behind this low tech but highly effective evidence based practice. Engorgement Care Guidelines:  Reviewed how milk is made and normal phases of milk production. Taught care of engorged breasts - frequent breastfeeding encouraged, cool packs and motrin as tolerated. Anticipatory guidance shared.

## 2021-07-10 NOTE — PROGRESS NOTES
Bedside and Verbal shift change report given to Elizabeth Sweeney RN (oncoming nurse) by Myranda Alberto RN (offgoing nurse). Report included the following information SBAR, Kardex, Intake/Output and MAR.

## 2021-08-25 ENCOUNTER — OFFICE VISIT (OUTPATIENT)
Dept: OBGYN CLINIC | Age: 36
End: 2021-08-25

## 2021-08-25 VITALS — BODY MASS INDEX: 25.86 KG/M2 | WEIGHT: 141.4 LBS | SYSTOLIC BLOOD PRESSURE: 115 MMHG | DIASTOLIC BLOOD PRESSURE: 78 MMHG

## 2021-08-25 PROCEDURE — 0503F POSTPARTUM CARE VISIT: CPT | Performed by: OBSTETRICS & GYNECOLOGY

## 2021-08-25 RX ORDER — ACETAMINOPHEN AND CODEINE PHOSPHATE 120; 12 MG/5ML; MG/5ML
1 SOLUTION ORAL DAILY
Qty: 1 PACKAGE | Refills: 12 | Status: SHIPPED | OUTPATIENT
Start: 2021-08-25

## 2021-08-25 NOTE — PROGRESS NOTES
Postpartum evaluation    Veronica Reed is a 39 y.o. female who presents for a postpartum exam.     She is now six weeks post normal spontaneous vaginal delivery 7/8/2021    Her baby is doing well. She has had no menses since delivery. She has had the following significant problems since her delivery: none    The patient is breast feeding without difficulty. The patient would like to use OCP for birth control. She is currently taking: no medications. She is due for her next AE in 3 months.      Visit Vitals  /78   Wt 141 lb 6.4 oz (64.1 kg)   Breastfeeding Yes   BMI 25.86 kg/m²       PHYSICAL EXAMINATION    Constitutional  · Appearance: well-nourished, well developed, alert, in no acute distress    HENT  · Head and Face: appears normal    Neck  · Inspection/Palpation: normal appearance, no masses or tenderness  · Lymph Nodes: no lymphadenopathy present  · Thyroid: gland size normal, nontender, no nodules or masses present on palpation    Breasts  · Inspection of Breasts: breasts symmetrical, no skin changes, no discharge present, nipple appearance normal, no skin retraction present  · Palpation of Breasts and Axillae: no masses present on palpation, no breast tenderness  · Axillary Lymph Nodes: no lymphadenopathy present    Gastrointestinal  · Abdominal Examination: abdomen non-tender to palpation, normal bowel sounds, no masses present  · Liver and spleen: no hepatomegaly present, spleen not palpable  · Hernias: no hernias identified    Genitourinary  · External Genitalia: normal appearance for age, no discharge present, no tenderness present, no inflammatory lesions present, no masses present, no atrophy present  · Vagina: normal vaginal vault without central or paravaginal defects, no discharge present, no inflammatory lesions present, no masses present  · Bladder: non-tender to palpation  · Urethra: appears normal  · Cervix: normal   · Uterus: normal size, shape and consistency  · Adnexa: no adnexal tenderness present, no adnexal masses present  · Perineum: perineum within normal limits, no evidence of trauma, no rashes or skin lesions present  · Anus: anus within normal limits, no hemorrhoids present  · Inguinal Lymph Nodes: no lymphadenopathy present    Skin  · General Inspection: no rash, no lesions identified    Neurologic/Psychiatric  · Mental Status:  · Orientation: grossly oriented to person, place and time  · Mood and Affect: mood normal, affect appropriate    Assessment:  Normal postpartum check    Plan:  RTO for AE.   Start micronor

## 2021-09-17 NOTE — PROGRESS NOTES
Labor Progress Note  Patient seen, fetal heart rate and contraction pattern evaluated, patient examined. Reports good fetal movement and states she feels contractions every 3-4 minutes. Denies abdominal pain and vaginal bleeding. No nausea, vomiting, fever, chills, shortness of breath, headache, or chest pain. No data found. Physical Exam:  Physical Exam  Constitutional:       Appearance: Normal appearance. Cardiovascular:      Rate and Rhythm: Normal rate and regular rhythm. Pulses: Normal pulses. Heart sounds: Normal heart sounds. No murmur heard. No friction rub. No gallop. Pulmonary:      Effort: Pulmonary effort is normal.      Breath sounds: Normal breath sounds. Abdominal:      Comments: Gravid abdomen, fundus firm and nontender to palpation. Neurological:      Mental Status: She is alert. Cervical Exam:    Sterile speculum examined performed by Dr. Elana Duong. Appears bag is still intact without ROM. Dilation (cm): 2  Eff: 100 %  Station: -2  Vaginal exam done by? : Dr. Yuli Ross Status: PROM (this morning )  Membranes:  Intact  Uterine Activity: Frequency: Every 1-2 minutes, Duration: 45 seconds and Intensity: mild  Fetal Heart Rate: Reactive  Baseline: 120 per minute  Variability: moderate  Accelerations: yes  Decelerations: none  Uterine contractions: regular, every 1-2 minutes    Bedside ultrasound by Dr. Ozzie Peter reveals MARCOS of 7.1. Assessment/Plan:  Ms. Terrence Sterling is a 39 y.o.   female with an estimated gestational age of 29w2d who is admitted with  premature rupture of membranes. Pregnancy complicated by advanced maternal age, GDM, h/o LEEP in , and IUGR. Patient is nitrazine positive.   Concern for PPROM - Nitrazine positive last night. Amnisure not done. Sterile speculum this morning reveals bag intact. MARCOS on bedside ultrasound 7. Betamethasone 12mg IM received at  on . Next dose due tonight.  PCN 5 million units x1 and PCN 2.5 millions units q4h for unknown GBS status. S/p Misoprostol 25 mcg x1. Pitocin started at 0100. SVE 2/100/-2 at 9:15am. Stop Pitocin until Dr. Anna Marie Dougherty speaks to [facility]. SIUP: PNL - O+, RPR false pos, Tpall neg x2, HIV/HepB/GC/CT neg, pap NILM, HPV neeg, Hgb fractionation wnl, Rubella/VZV immune. NIPT low risk, female.   AMA : NIPT low risk.   GDM: Diet controlled. Will check blood sugar every hour in active labor. Fasting blood sugar this morning 99. H/o LEEP: Performed in 2012 at [facility]. Normal PAPs since that time. 3/25 scan with low normal (27.5cm) cervical length. IUGR: ultrasound done 6/30/21 with EFW 9th% and AC <5th%.   Patient discussed with Dr. Anna Marie Dougherty.    Mark Carrion MD  Family Medicine Resident 36.6

## 2022-03-18 PROBLEM — Z3A.10 10 WEEKS GESTATION OF PREGNANCY: Status: ACTIVE | Noted: 2021-01-11

## 2022-03-19 PROBLEM — Z34.90 PREGNANCY: Status: ACTIVE | Noted: 2021-07-06

## 2022-03-19 PROBLEM — O42.919 PRETERM PREMATURE RUPTURE OF MEMBRANES: Status: ACTIVE | Noted: 2021-07-06

## 2022-03-19 PROBLEM — N83.202 LEFT OVARIAN CYST: Status: ACTIVE | Noted: 2021-02-17

## 2022-03-19 PROBLEM — O20.0 THREATENED ABORTION, ANTEPARTUM: Status: ACTIVE | Noted: 2021-01-11

## 2022-03-19 PROBLEM — O09.522 MULTIGRAVIDA OF ADVANCED MATERNAL AGE IN SECOND TRIMESTER: Status: ACTIVE | Noted: 2021-02-17

## 2022-03-20 PROBLEM — Z98.890 HISTORY OF LOOP ELECTRICAL EXCISION PROCEDURE (LEEP): Status: ACTIVE | Noted: 2021-01-20

## 2022-03-20 PROBLEM — B97.7 HPV (HUMAN PAPILLOMA VIRUS) INFECTION: Status: ACTIVE | Noted: 2021-01-20

## 2022-03-20 PROBLEM — O44.02 PLACENTA PREVIA IN SECOND TRIMESTER: Status: ACTIVE | Noted: 2021-02-17

## 2022-08-13 NOTE — PROGRESS NOTES
Estimated Date of Delivery: 21    Hx of LEEP   Cervix low normal length at 27.5mm  Left ovarian cyst unchanged   Short humerus length but NIPT pending  Repeat scan early 3rd trimester No

## 2023-05-15 RX ORDER — IBUPROFEN 800 MG/1
800 TABLET ORAL EVERY 12 HOURS
COMMUNITY
Start: 2021-07-10

## 2023-05-15 RX ORDER — LANCETS 30 GAUGE
EACH MISCELLANEOUS
COMMUNITY
Start: 2021-05-07

## 2023-05-15 RX ORDER — ACETAMINOPHEN AND CODEINE PHOSPHATE 120; 12 MG/5ML; MG/5ML
0.35 SOLUTION ORAL DAILY
COMMUNITY
Start: 2021-08-25

## 2024-06-10 NOTE — PROGRESS NOTES
Concern for BV, monitor for symptoms, consider wet prep on next visit if needed Patient : Ana Murry Age: 75 year old Sex: female   MRN: [id number] Encounter Date: 6/10/2024      History     Chief Complaint   Patient presents with    Medical Screening Exam     HPI  The patient is a 75-year-old female who presents the ED, with her daughter who is activated power of  from nursing facility, due to concerns for low blood pressure and low temperature.  Supposedly at the nursing home this evening the patient's blood pressure and temperature were low.  Patient was recently admitted for pneumonia.  Patient denies any symptoms.  The daughter states that the patient was at her baseline.  Patient is on 1 L of oxygen at baseline during the night and usually with exertion.    Allergies   Allergen Reactions    Diamox [Acetazolamide] RASH     Facial rash noted after 4 doses given, resolved with Benadryl & steroids . Could have been reaction to Bipap mask    Aspirin NAUSEA     Can tolerate 81 mg.       Current Discharge Medication List        Prior to Admission Medications    Details   predniSONE (DELTASONE) 20 MG tablet Take 2 tablets by mouth daily for 5 days.  Qty: 10 tablet, Refills: 0      guaiFENesin (MUCINEX) 600 MG 12 hr tablet Take 1,200 mg by mouth in the morning and 1,200 mg in the evening. For 5 days      albuterol (VENTOLIN) (2.5 MG/3ML) 0.083% nebulizer solution Take 2.5 mg by nebulization every 4 hours.      sertraline (ZOLOFT) 25 MG tablet Take 75 mg by mouth daily.      cholecalciferol (Vitamin D, Cholecalciferol,) 25 mcg(1,000 units) tablet Take 25 mcg by mouth in the morning and 25 mcg in the evening.      DISPENSE DEEP BLUE CAPSULE - Take 1 capsule by mouth twice daily for pain      sodium chloride, hypertonic, (Alejo 128) 2 % ophthalmic solution Place 1 drop into right eye in the morning and 1 drop in the evening.      bisacodyl (DULCOLAX) 10 MG suppository Place 10 mg rectally daily as needed for Constipation.      magnesium hydroxide (MILK OF MAGNESIA) 400 MG/5ML suspension  Take 30 mLs by mouth daily as needed for Constipation.      polyethylene glycol (MIRALAX) 17 g packet Take 17 g by mouth daily as needed. Indications: Constipation Stir and dissolve powder in any 4 to 8 ounces of beverage, then drink. (Must be mixed with Xantham based thickener such as Simply Thick gel)      docusate sodium-sennosides (SENOKOT S) 50-8.6 MG per tablet Take 2 tablets by mouth daily as needed for Constipation.      donepezil (ARICEPT) 10 MG tablet Take 1 tablet by mouth nightly.  Qty: 30 tablet, Refills: 11      furosemide (LASIX) 40 MG tablet Take 40 mg by mouth daily.  Qty: 30 tablet, Refills: 11      memantine (NAMENDA) 10 MG tablet Take 1 tablet by mouth in the morning and 1 tablet in the evening.      acetaminophen (TYLENOL) 325 MG tablet Take 650 mg by mouth every 4 hours as needed for Pain or Fever.      DISPENSE Take 2 capsules by mouth 2 times daily. Dot[employer] Plex [employer] Food nutrient complex      DISPENSE Take 2 capsules by mouth 2 times daily. Do not start before January 17, 2023. ALPHA CRS, CELLULAR VITALITY COMPLEX      DISPENSE Take 2 capsules by mouth 2 times daily. Do not start before January 17, 2023. Essential OIL OMEGA COMPLEX      simvastatin (ZOCOR) 20 MG tablet TAKE 1 TABLET NIGHTLY (REPLACES ROSUVASTATIN)  Qty: 90 tablet, Refills: 3      potassium CHLORIDE (Klor-Con M) 20 MEQ jena ER tablet Take 1 tablet by mouth daily.  Qty: 90 tablet, Refills: 3      metoPROLOL tartrate (LOPRESSOR) 25 MG tablet TAKE 1 TABLET TWICE A DAY  Qty: 180 tablet, Refills: 3             Past Medical History:   Diagnosis Date    Aortic stenosis, moderate 9/18/2013    Arthritis     Mainly hips, knees and low back    Blood clot associated with vein wall inflammation 2000    Leg    Cataract     Chronic pain     Diverticulosis 3/27/2014    Noted on colonoscopy in 2004.     Essential hypertension, benign 9/18/2013    GERD (gastroesophageal reflux disease) 9/18/2013    many years ago    Heart murmur      Hyperlipidemia     Obesity     Routine Papanicolaou smear 03/03/2011    SOB (shortness of breath)     Urinary tract infection     yeas ago       Past Surgical History:   Procedure Laterality Date    BOTOX INJECTION BLADDER      CARDIAC CATHERIZATION  02/17/2016    CARDIAC DUAL CHAMBER PACEMAKER PLACEMENT Left 03/01/2016    EP study, Left dual chamber pacemaker insertion    CATARACT EXTRACTION W/  INTRAOCULAR LENS IMPLANT Right 11/28/2022    Venkat Gallego MD    COLONOSCOPY  03/20/2015    repeat in 10 years    COLONOSCOPY DIAGNOSTIC  09/20/2004    DEXA BONE DENSITY AXIAL SKELETON  03/20/2013    GYNECOLOGIC CRYOSURGERY      IRIDOTOMY/IRIDECTOMY BY LASER Left 07/05/2021    Jaiden Lowe MD    IRIDOTOMY/IRIDECTOMY BY LASER Right 02/15/2022    REPLAC AORT VALV PROSTH VALV  02/26/2016    TONSILLECTOMY AND ADENOIDECTOMY      as a child    TOTAL KNEE ARTHROPLASTY Left 2000    left    TUBAL LIGATION  1977    VEIN LIGATION AND STRIPPING Right     1980s    VEIN LIGATION AND STRIPPING Left     2006       Family History   Problem Relation Age of Onset    Diabetes Mother     Glaucoma Mother     Heart Mother         heart murmur    Emphysema Father     * Father         enlarged prostate    Dementia/Alzheimers Father         developed dementia later in life    Cancer Father         prostate cancer    * Sister         fibromyalgia    Osteoarthritis Sister     Dementia/Alzheimers Maternal Grandmother         developed dementia later in life    Peripheral Vascular Disease Paternal Grandmother         varicose veins    Osteoarthritis Sister     Osteoarthritis Brother     Osteoarthritis Brother     Thyroid Brother     Thyroid Brother     Patient is unaware of any medical problems Son     Patient is unaware of any medical problems Daughter     Patient is unaware of any medical problems Daughter     Patient is unaware of any medical problems Daughter     Other Granddaughter         drug use       Social History     Tobacco Use    Smoking  status: Former     Current packs/day: 0.00     Average packs/day: 1 pack/day for 32.0 years (32.0 ttl pk-yrs)     Types: Cigarettes     Start date: 1966     Quit date: 1998     Years since quittin.4    Smokeless tobacco: Never    Tobacco comments:     The patient quit smoking in . Prior to quitting, she smoked 1 pack of cigarettes per day for 32 years.   Vaping Use    Vaping status: never used   Substance Use Topics    Alcohol use: Yes     Comment: very rare alcohol intake.    Drug use: No       E-cigarette/Vaping    E-Cigarette/Vaping Use Never Used     Passive Exposure No     Counseling Given No      E-Cigarette/Vaping Substances & Devices       Review of Systems   Constitutional:  Negative for chills and fever.   HENT:  Negative for congestion, ear pain, rhinorrhea, sinus pressure, sinus pain and sore throat.    Eyes:  Negative for pain and visual disturbance.   Respiratory:  Negative for cough and shortness of breath.    Cardiovascular:  Negative for chest pain and palpitations.   Gastrointestinal:  Negative for abdominal pain, diarrhea, nausea and vomiting.   Genitourinary:  Negative for dysuria.   Musculoskeletal:  Negative for arthralgias and myalgias.   Skin:  Negative for rash.   Neurological:  Negative for dizziness, syncope, weakness and headaches.       Physical Exam     ED Triage Vitals [06/10/24 0245]   ED Triage Vitals Group      Temp 96.6 °F (35.9 °C)      Heart Rate 72      Resp 20      /74      SpO2 95 %      EtCO2 mmHg       Height       Weight 249 lb 1.9 oz (113 kg)      Weight Scale Used Scale in bed      BMI (Calculated)       IBW/kg (Calculated)        Physical Exam  Vitals and nursing note reviewed.   Constitutional:       General: She is not in acute distress.     Appearance: Normal appearance. She is well-developed.   HENT:      Head: Normocephalic and atraumatic.      Right Ear: External ear normal.      Left Ear: External ear normal.      Nose: Nose normal.       Mouth/Throat:      Mouth: Mucous membranes are moist.   Eyes:      General: Lids are normal.      Conjunctiva/sclera: Conjunctivae normal.   Cardiovascular:      Rate and Rhythm: Normal rate and regular rhythm.      Pulses:           Radial pulses are 2+ on the right side.   Pulmonary:      Effort: Pulmonary effort is normal. No accessory muscle usage or respiratory distress.      Breath sounds: Normal breath sounds.   Abdominal:      General: Abdomen is flat. Bowel sounds are normal.      Palpations: Abdomen is soft.      Tenderness: There is no abdominal tenderness. There is no guarding or rebound.   Musculoskeletal:      Cervical back: Full passive range of motion without pain. No spinous process tenderness or muscular tenderness.   Skin:     General: Skin is warm and dry.      Capillary Refill: Capillary refill takes less than 2 seconds.   Neurological:      Mental Status: She is alert and oriented to person, place, and time.      GCS: GCS eye subscore is 4. GCS verbal subscore is 5. GCS motor subscore is 6.   Psychiatric:         Speech: Speech normal.         Behavior: Behavior normal.         Thought Content: Thought content normal.         ED Course     Procedures    Lab Results     Results for orders placed or performed during the hospital encounter of 06/10/24   Comprehensive Metabolic Panel   Result Value Ref Range    Fasting Status      Sodium 139 135 - 145 mmol/L    Potassium 4.3 3.4 - 5.1 mmol/L    Chloride 99 97 - 110 mmol/L    Carbon Dioxide 34 (H) 21 - 32 mmol/L    Anion Gap 10 7 - 19 mmol/L    Glucose 208 (H) 70 - 99 mg/dL    BUN 35 (H) 6 - 20 mg/dL    Creatinine 1.05 (H) 0.51 - 0.95 mg/dL    Glomerular Filtration Rate 55 (L) >=60    BUN/Cr 33 (H) 7 - 25    Calcium 9.7 8.4 - 10.2 mg/dL    Bilirubin, Total 0.7 0.2 - 1.0 mg/dL    GOT/ (H) <=37 Units/L    GPT/ (H) <64 Units/L    Alkaline Phosphatase 82 45 - 117 Units/L    Albumin 3.2 (L) 3.6 - 5.1 g/dL    Protein, Total 6.6 6.4 - 8.2  g/dL    Globulin 3.4 2.0 - 4.0 g/dL    A/G Ratio 0.9 (L) 1.0 - 2.4   Procalcitonin   Result Value Ref Range    Procalcitonin <0.05 <=0.09 ng/mL   Prothrombin Time (INR/PT)   Result Value Ref Range    Protime- PT 11.3 9.7 - 11.8 sec    INR 1.0     Partial Thromboplastin Time (PTT)   Result Value Ref Range    PTT 25 22 - 32 sec   Urinalysis & Reflex Microscopy With Culture If Indicated   Result Value Ref Range    COLOR, URINALYSIS Yellow     APPEARANCE, URINALYSIS Cloudy     GLUCOSE, URINALYSIS Trace (A) Negative mg/dL    BILIRUBIN, URINALYSIS Negative Negative    KETONES, URINALYSIS Negative Negative mg/dL    SPECIFIC GRAVITY, URINALYSIS 1.024 1.005 - 1.030    OCCULT BLOOD, URINALYSIS Trace (A) Negative    PH, URINALYSIS 5.5 5.0 - 7.0    PROTEIN, URINALYSIS 100 (A) Negative mg/dL    UROBILINOGEN, URINALYSIS 2.0 (A) 0.2, 1.0 mg/dL    NITRITE, URINALYSIS Negative Negative    LEUKOCYTE ESTERASE, URINALYSIS Trace (A) Negative    SQUAMOUS EPITHELIAL, URINALYSIS 1 to 5 None Seen, 1 to 5 /hpf    ERYTHROCYTES, URINALYSIS 1 to 2 None Seen, 1 to 2 /hpf    LEUKOCYTES, URINALYSIS 1 to 5 None Seen, 1 to 5 /hpf    BACTERIA, URINALYSIS Few (A) None Seen /hpf    HYALINE CASTS, URINALYSIS 11 to 25 (A) None Seen, 1 to 5 /lpf    GRANULAR CASTS 1 to 5 (A) None Seen /lpf    TRANSITIONAL EPITHELIALS 1 to 5 1 to 5, None Seen /hpf    MUCUS Present    TROPONIN I, HIGH SENSITIVITY   Result Value Ref Range    Troponin I, High Sensitivity 1,631 (HH) <52 ng/L   NT proBNP   Result Value Ref Range    NT-proBNP 1,557 (H) <=450 pg/mL   CBC with Automated Differential (performable only)   Result Value Ref Range    WBC 17.5 (H) 4.2 - 11.0 K/mcL    RBC 4.75 4.00 - 5.20 mil/mcL    HGB 14.7 12.0 - 15.5 g/dL    HCT 45.3 36.0 - 46.5 %    MCV 95.4 78.0 - 100.0 fl    MCH 30.9 26.0 - 34.0 pg    MCHC 32.5 32.0 - 36.5 g/dL    RDW-CV 13.1 11.0 - 15.0 %    RDW-SD 45.7 39.0 - 50.0 fL     140 - 450 K/mcL    NRBC 0 <=0 /100 WBC   Manual Differential   Result  Value Ref Range    Neutrophil, Percent 88 %    Lymphocytes, Percent 7 %    Mono, Percent 3 %    Eosinophils, Percent 1 %    Metamyelocytes, Percent  1 0 - 2 %    Absolute Neutrophil 15.4 (H) 1.8 - 7.7 K/mcL    Absolute Lymphocytes 1.2 1.0 - 4.0 K/mcL    Absolute Monocytes 0.5 0.3 - 0.9 K/mcL    Absolute Eosinophils 0.2 0.0 - 0.5 K/mcL    RBC Morphology Normal Normal    Platelet Morphology Normal Normal   GLUCOSE, BEDSIDE - POINT OF CARE   Result Value Ref Range    GLUCOSE, BEDSIDE - POINT OF CARE 216 (H) 70 - 99 mg/dL       EKG Results     EKG Interpretation  Rate: 82  Rhythm: Paced rhythm   Abnormality: yes    EKG tracing interpreted by ED physician    EKG Interpretation  Rate: 60  Rhythm: Paced rhythm   Abnormality: yes    EKG tracing interpreted by ED physician    Radiology Results     Imaging Results              CTA CHEST PE AND CT ABD PEL W CONTRAST (Final result)  Result time 06/10/24 04:21:16      Final result                   Impression:    IMPRESSION:  1. No evidence of pulmonary embolus.  2. Mosaic groundglass changes with areas of air trapping as well as  peribronchial thickening likely related to small airway disease or reactive  airway disease. Intermittent mucous plugging is seen in both lower lobes  with bibasilar atelectasis. There is no other focal consolidation.  3. Four-chamber cardiomegaly. There is reflux of contrast into distended  intrahepatic inferior vena cava and hepatic veins which can be seen in  right heart dysfunction.  4. Fusiform ectasia of the ascending thoracic aorta measuring 4 cm similar  to the study of 2016.  5. Diverticulosis without diverticulitis.  6. Cholelithiasis without biliary dilatation or secondary findings of acute  cholecystitis.  7. Diffuse thickening of the endometrium of the uterus. This is abnormal in  a postmenopausal patient. It measures up to 16 mm. Correlation with any  dysfunctional uterine bleeding is suggested. Gynecologic follow-up or  nonemergent  pelvic ultrasound could also be considered. Endometrial  thickening was also noted on the pelvic ultrasound study of 3/13/2012.    Electronically Signed by: Fela Rodarte MD  Signed on: 6/10/2024 4:21 AM  Created on Workstation ID: [id number]  Signed on Workstation ID: [id number]               Narrative:    EXAM: CTA CHEST PE AND CT ABD PEL W CONTRAST    INDICATION: Elevated troponin levels and leukocytosis. Hypotension.  Possible sepsis. Shortness of breath.    COMPARISON: CT angiogram of the chest from 2/2/2016.    TECHNIQUE: CT angiographic images through the chest were performed with  particular attention made to the pulmonary arterial system. 100 mL  Omnipaque 350 contrast was given for the study. MIP reformatted images were  acquired. Following the CT angiogram of the chest, postcontrast axial CT  images of the abdomen and pelvis were included.    This CT exam was performed using one or more of the following dose  reduction techniques: Automated exposure control, adjustment of the mA  and/or KV according to patient size, and/or use of iterative reconstructive  technique.    FINDINGS: There is no evidence of pulmonary embolus. The heart size is  enlarged with four-chamber cardiac enlargement. Patient's had previous  aortic valvuloplasty. Pacemaker wires are identified within the heart.  There is fusiform ectasia of the ascending thoracic aorta measuring 4 cm.  This is stable compared to the study of 2/2/2016. The central pulmonary  arteries are normal in size. Coronary artery calcification is noted. There  is no pericardial effusion or pericardial thickening. There is no axillary  or supraclavicular adenopathy. There is no significant mediastinal or hilar  adenopathy. A few small lymph nodes are noted. There is mild peribronchial  thickening with intermittent mucous plugging in both lower lobes, right  greater than left with mild bibasilar atelectasis. There is no other focal  consolidation, pleural  effusion, or pneumothorax. Mosaic groundglass  changes and areas of air trapping are likely related to small airway  disease. The more central airways are widely patent. There is no acute  fracture within the chest. Mild levoscoliosis is noted.    There is reflux of contrast into distended intrahepatic inferior vena cava  and hepatic veins which can be seen in right heart dysfunction. There is  mild heterogeneous attenuation of the liver without focal liver lesion.  There are gallstones without biliary dilatation or secondary findings of  acute cholecystitis. The spleen, pancreas, and adrenal glands are  unremarkable. The kidneys enhance symmetrically. There is no  nephrolithiasis or hydronephrosis. The abdominal aorta is normal in course  and caliber. There is no adenopathy or ascites within the abdomen or  pelvis. The urinary bladder is decompressed and difficult to evaluate.  Endometrial thickening of the uterus is identified measuring up to 16 mm.  This is abnormal for a postmenopausal patient. Correlation with any  dysfunctional uterine bleeding is suggested. Nonemergent follow-up  gynecologic evaluation and/or pelvic ultrasound could be considered. A  calcified degenerated uterine fibroid is noted. Adnexal regions are  unremarkable. High attenuation material within the rectum and colon is  likely related to the oral contrast from the swallow study on 6/7/2024.  There is diverticulosis without diverticulitis. There is no bowel  obstruction, pneumatosis, abscess, or free intraperitoneal air. The  appendix is normal. There is a duodenal diverticulum at the level of the  ampulla. There is no fracture within the abdomen or pelvis. Degenerative  change of both sacroiliac joints is noted along with multilevel lumbar  spondylosis changes.                                       XR CHEST PA OR AP 1 VIEW (Final result)  Result time 06/10/24 03:32:14      Final result                   Impression:    IMPRESSION:  1. No  acute cardiopulmonary disease.    Electronically Signed by: Fela Rodarte MD  Signed on: 6/10/2024 3:32 AM  Created on Workstation ID: [id number]  Signed on Workstation ID: [id number]               Narrative:    EXAM: XR CHEST AP OR PA    INDICATION: Fever and hypotension    COMPARISON: 6/4/2024 and 6/1/2024    TECHNIQUE: AP view of the chest is obtained 6/10/2024 at 0325 hours.    FINDINGS: Postoperative changes are seen from aortic valvuloplasty. Left  subclavian dual-lead pacemaker is in stable and satisfactory position.  Heart size is normal. Central vessels are normal. There is no focal  consolidation, pleural effusion, or pneumothorax.                                      ED Medication Orders (From admission, onward)      Ordered Start     Status Ordering Provider    06/10/24 0440 06/10/24 0440  VANCOMYCIN - PHARMACIST MONITORED Misc  (Pharmacy to dose and monitor vancomycin therapy)  SEE ADMIN INSTRUCTIONS        Placed in \"And\" Linked Group    Acknowledged REMY DAVILA    06/10/24 0402 06/10/24 0415  cefepime (MAXIPIME) 1,000 mg in sodium chloride 0.9 % 100 mL IVPB  ONCE         Acknowledged REMY DAVILA                 Medical Decision Making  Patient presents from nursing home with daughter who is activated power of  due to concerns for low blood pressure and low temperature.  Patient was recently admitted for pneumonia.  Daughter states the patient is on 1 L of oxygen while she sleeps and usually with exertion.  Patient has no complaints at this time.  On physical exam, the patient is awake and at her baseline per the daughter.  She is afebrile.  Heart is regular rate and rhythm.  Lungs auscultation bilaterally.  Abdomen is soft nontender palpation.  Due to the patient's presenting symptoms, IV access obtained laboratories drawn and imaging ordered.    The UA revealed trace glucose, trace blood, 100 protein, trace leukocyte esterase and few bacteria.  Cefepime was ordered for the patient.   CMP revealed a glucose of 208, creatinine of 1.05, AST of 192 and ALT of 268.  Procalcitonin was negative.  PT and PTT were unremarkable.  The troponin was elevated at 1631.  EKG shows a paced rhythm however no Sgarbossa criteria.  The NT proBNP was 1557 which is improved from the patient's previous.  The CBC revealed a WBC of 17.5.  The chest x-ray revealed:   Impression:    IMPRESSION:  1. No acute cardiopulmonary disease.              CTA chest PE and CT abdomen pelvis revealed:   Impression:    IMPRESSION:  1. No evidence of pulmonary embolus.  2. Mosaic groundglass changes with areas of air trapping as well as  peribronchial thickening likely related to small airway disease or reactive  airway disease. Intermittent mucous plugging is seen in both lower lobes  with bibasilar atelectasis. There is no other focal consolidation.  3. Four-chamber cardiomegaly. There is reflux of contrast into distended  intrahepatic inferior vena cava and hepatic veins which can be seen in  right heart dysfunction.  4. Fusiform ectasia of the ascending thoracic aorta measuring 4 cm similar  to the study of 2016.  5. Diverticulosis without diverticulitis.  6. Cholelithiasis without biliary dilatation or secondary findings of acute  cholecystitis.  7. Diffuse thickening of the endometrium of the uterus. This is abnormal in  a postmenopausal patient. It measures up to 16 mm. Correlation with any  dysfunctional uterine bleeding is suggested. Gynecologic follow-up or  nonemergent pelvic ultrasound could also be considered. Endometrial  thickening was also noted on the pelvic ultrasound study of 3/13/2012.           Vancomycin was added to cover for possible bacteremia.  I spoke with Dr. Valencia, hospitalist, who kindly accepts patient for admission.  I did discuss obtaining a right upper quadrant ultrasound with him however he does not feel necessary at this time.  I appreciate his assistance.  Patient and family were updated with the  plan and are in agreement.  Patient stable at time of admission.    Clinical Impression     ED Diagnosis   1. Urinary tract infection with hematuria, site unspecified        2. Elevated LFTs        3. Elevated troponin        4. Ground glass opacity present on imaging of lung        5. Cardiomegaly        6. Diverticulosis        7. Calculus of gallbladder without cholecystitis without obstruction        8. Thickened endometrium            Disposition        Admit 6/10/2024  4:43 AM  Telemetry Bed?: Yes  Admitting Physician: TACOS MARTINEZ [[id number]]  Is this a telephone or verbal order?: This is a telephone order from the admitting physician         Leidy Newton PA-C  06/10/24 0456